# Patient Record
Sex: FEMALE | Race: ASIAN | NOT HISPANIC OR LATINO
[De-identification: names, ages, dates, MRNs, and addresses within clinical notes are randomized per-mention and may not be internally consistent; named-entity substitution may affect disease eponyms.]

---

## 2017-11-03 ENCOUNTER — APPOINTMENT (OUTPATIENT)
Dept: OBGYN | Facility: CLINIC | Age: 37
End: 2017-11-03

## 2018-03-24 ENCOUNTER — OUTPATIENT (OUTPATIENT)
Dept: OUTPATIENT SERVICES | Facility: HOSPITAL | Age: 38
LOS: 1 days | End: 2018-03-24
Payer: COMMERCIAL

## 2018-03-24 DIAGNOSIS — O26.899 OTHER SPECIFIED PREGNANCY RELATED CONDITIONS, UNSPECIFIED TRIMESTER: ICD-10-CM

## 2018-03-24 DIAGNOSIS — Z3A.00 WEEKS OF GESTATION OF PREGNANCY NOT SPECIFIED: ICD-10-CM

## 2018-03-24 PROCEDURE — G0463: CPT

## 2018-03-24 PROCEDURE — 59025 FETAL NON-STRESS TEST: CPT

## 2018-04-18 ENCOUNTER — TRANSCRIPTION ENCOUNTER (OUTPATIENT)
Age: 38
End: 2018-04-18

## 2018-06-17 ENCOUNTER — OUTPATIENT (OUTPATIENT)
Dept: OUTPATIENT SERVICES | Facility: HOSPITAL | Age: 38
LOS: 1 days | End: 2018-06-17
Payer: COMMERCIAL

## 2018-06-17 DIAGNOSIS — O26.899 OTHER SPECIFIED PREGNANCY RELATED CONDITIONS, UNSPECIFIED TRIMESTER: ICD-10-CM

## 2018-06-17 DIAGNOSIS — Z3A.00 WEEKS OF GESTATION OF PREGNANCY NOT SPECIFIED: ICD-10-CM

## 2018-06-17 PROCEDURE — G0463: CPT

## 2018-06-17 PROCEDURE — 59025 FETAL NON-STRESS TEST: CPT

## 2018-06-17 PROCEDURE — 59025 FETAL NON-STRESS TEST: CPT | Mod: 26

## 2018-06-19 ENCOUNTER — TRANSCRIPTION ENCOUNTER (OUTPATIENT)
Age: 38
End: 2018-06-19

## 2018-06-20 ENCOUNTER — INPATIENT (INPATIENT)
Facility: HOSPITAL | Age: 38
LOS: 1 days | Discharge: HOME | End: 2018-06-22
Attending: OBSTETRICS & GYNECOLOGY | Admitting: OBSTETRICS & GYNECOLOGY
Payer: COMMERCIAL

## 2018-06-20 VITALS — WEIGHT: 145.51 LBS | HEIGHT: 62 IN

## 2018-06-20 DIAGNOSIS — Z3A.00 WEEKS OF GESTATION OF PREGNANCY NOT SPECIFIED: ICD-10-CM

## 2018-06-20 DIAGNOSIS — Z34.80 ENCOUNTER FOR SUPERVISION OF OTHER NORMAL PREGNANCY, UNSPECIFIED TRIMESTER: ICD-10-CM

## 2018-06-20 DIAGNOSIS — O26.899 OTHER SPECIFIED PREGNANCY RELATED CONDITIONS, UNSPECIFIED TRIMESTER: ICD-10-CM

## 2018-06-20 LAB
BASOPHILS # BLD AUTO: 0.1 K/UL — SIGNIFICANT CHANGE UP (ref 0–0.2)
BASOPHILS NFR BLD AUTO: 0.5 % — SIGNIFICANT CHANGE UP (ref 0–2)
BLD GP AB SCN SERPL QL: NEGATIVE — SIGNIFICANT CHANGE UP
EOSINOPHIL # BLD AUTO: 0.1 K/UL — SIGNIFICANT CHANGE UP (ref 0–0.5)
EOSINOPHIL NFR BLD AUTO: 0.9 % — SIGNIFICANT CHANGE UP (ref 0–6)
HCT VFR BLD CALC: 38.9 % — SIGNIFICANT CHANGE UP (ref 34.5–45)
HGB BLD-MCNC: 12.8 G/DL — SIGNIFICANT CHANGE UP (ref 11.5–15.5)
LYMPHOCYTES # BLD AUTO: 1.8 K/UL — SIGNIFICANT CHANGE UP (ref 1–3.3)
LYMPHOCYTES # BLD AUTO: 15.6 % — SIGNIFICANT CHANGE UP (ref 13–44)
MCHC RBC-ENTMCNC: 30.5 PG — SIGNIFICANT CHANGE UP (ref 27–34)
MCHC RBC-ENTMCNC: 32.9 GM/DL — SIGNIFICANT CHANGE UP (ref 32–36)
MCV RBC AUTO: 92.7 FL — SIGNIFICANT CHANGE UP (ref 80–100)
MONOCYTES # BLD AUTO: 0.9 K/UL — SIGNIFICANT CHANGE UP (ref 0–0.9)
MONOCYTES NFR BLD AUTO: 7.8 % — SIGNIFICANT CHANGE UP (ref 2–14)
NEUTROPHILS # BLD AUTO: 8.6 K/UL — HIGH (ref 1.8–7.4)
NEUTROPHILS NFR BLD AUTO: 75.1 % — SIGNIFICANT CHANGE UP (ref 43–77)
PLATELET # BLD AUTO: 221 K/UL — SIGNIFICANT CHANGE UP (ref 150–400)
RBC # BLD: 4.19 M/UL — SIGNIFICANT CHANGE UP (ref 3.8–5.2)
RBC # FLD: 13.6 % — SIGNIFICANT CHANGE UP (ref 10.3–14.5)
RH IG SCN BLD-IMP: POSITIVE — SIGNIFICANT CHANGE UP
RH IG SCN BLD-IMP: POSITIVE — SIGNIFICANT CHANGE UP
T PALLIDUM AB TITR SER: NEGATIVE — SIGNIFICANT CHANGE UP
WBC # BLD: 11.5 K/UL — HIGH (ref 3.8–10.5)
WBC # FLD AUTO: 11.5 K/UL — HIGH (ref 3.8–10.5)

## 2018-06-20 RX ORDER — OXYTOCIN 10 UNIT/ML
333.33 VIAL (ML) INJECTION
Qty: 20 | Refills: 0 | Status: COMPLETED | OUTPATIENT
Start: 2018-06-20

## 2018-06-20 RX ORDER — IBUPROFEN 200 MG
600 TABLET ORAL EVERY 6 HOURS
Qty: 0 | Refills: 0 | Status: DISCONTINUED | OUTPATIENT
Start: 2018-06-20 | End: 2018-06-22

## 2018-06-20 RX ORDER — DOCUSATE SODIUM 100 MG
100 CAPSULE ORAL
Qty: 0 | Refills: 0 | Status: DISCONTINUED | OUTPATIENT
Start: 2018-06-20 | End: 2018-06-20

## 2018-06-20 RX ORDER — OXYTOCIN 10 UNIT/ML
41.67 VIAL (ML) INJECTION
Qty: 20 | Refills: 0 | Status: DISCONTINUED | OUTPATIENT
Start: 2018-06-20 | End: 2018-06-20

## 2018-06-20 RX ORDER — KETOROLAC TROMETHAMINE 30 MG/ML
30 SYRINGE (ML) INJECTION ONCE
Qty: 0 | Refills: 0 | Status: DISCONTINUED | OUTPATIENT
Start: 2018-06-20 | End: 2018-06-20

## 2018-06-20 RX ORDER — HYDROCORTISONE 1 %
1 OINTMENT (GRAM) TOPICAL EVERY 4 HOURS
Qty: 0 | Refills: 0 | Status: DISCONTINUED | OUTPATIENT
Start: 2018-06-20 | End: 2018-06-20

## 2018-06-20 RX ORDER — SODIUM CHLORIDE 9 MG/ML
1000 INJECTION, SOLUTION INTRAVENOUS ONCE
Qty: 0 | Refills: 0 | Status: COMPLETED | OUTPATIENT
Start: 2018-06-20 | End: 2018-06-20

## 2018-06-20 RX ORDER — OXYCODONE HYDROCHLORIDE 5 MG/1
5 TABLET ORAL
Qty: 0 | Refills: 0 | Status: DISCONTINUED | OUTPATIENT
Start: 2018-06-20 | End: 2018-06-22

## 2018-06-20 RX ORDER — SODIUM CHLORIDE 9 MG/ML
1000 INJECTION, SOLUTION INTRAVENOUS
Qty: 0 | Refills: 0 | Status: DISCONTINUED | OUTPATIENT
Start: 2018-06-20 | End: 2018-06-20

## 2018-06-20 RX ORDER — SODIUM CHLORIDE 9 MG/ML
3 INJECTION INTRAMUSCULAR; INTRAVENOUS; SUBCUTANEOUS EVERY 8 HOURS
Qty: 0 | Refills: 0 | Status: DISCONTINUED | OUTPATIENT
Start: 2018-06-20 | End: 2018-06-22

## 2018-06-20 RX ORDER — ACETAMINOPHEN 500 MG
975 TABLET ORAL EVERY 6 HOURS
Qty: 0 | Refills: 0 | Status: DISCONTINUED | OUTPATIENT
Start: 2018-06-20 | End: 2018-06-22

## 2018-06-20 RX ORDER — DIBUCAINE 1 %
1 OINTMENT (GRAM) RECTAL EVERY 4 HOURS
Qty: 0 | Refills: 0 | Status: DISCONTINUED | OUTPATIENT
Start: 2018-06-20 | End: 2018-06-20

## 2018-06-20 RX ORDER — OXYTOCIN 10 UNIT/ML
333.33 VIAL (ML) INJECTION
Qty: 20 | Refills: 0 | Status: DISCONTINUED | OUTPATIENT
Start: 2018-06-20 | End: 2018-06-22

## 2018-06-20 RX ORDER — SODIUM CHLORIDE 9 MG/ML
3 INJECTION INTRAMUSCULAR; INTRAVENOUS; SUBCUTANEOUS EVERY 8 HOURS
Qty: 0 | Refills: 0 | Status: DISCONTINUED | OUTPATIENT
Start: 2018-06-20 | End: 2018-06-20

## 2018-06-20 RX ORDER — DOCUSATE SODIUM 100 MG
100 CAPSULE ORAL THREE TIMES A DAY
Qty: 0 | Refills: 0 | Status: DISCONTINUED | OUTPATIENT
Start: 2018-06-20 | End: 2018-06-20

## 2018-06-20 RX ORDER — CITRIC ACID/SODIUM CITRATE 300-500 MG
15 SOLUTION, ORAL ORAL EVERY 4 HOURS
Qty: 0 | Refills: 0 | Status: DISCONTINUED | OUTPATIENT
Start: 2018-06-20 | End: 2018-06-20

## 2018-06-20 RX ORDER — OXYCODONE HYDROCHLORIDE 5 MG/1
5 TABLET ORAL EVERY 4 HOURS
Qty: 0 | Refills: 0 | Status: DISCONTINUED | OUTPATIENT
Start: 2018-06-20 | End: 2018-06-22

## 2018-06-20 RX ORDER — PRAMOXINE HYDROCHLORIDE 150 MG/15G
1 AEROSOL, FOAM RECTAL EVERY 4 HOURS
Qty: 0 | Refills: 0 | Status: DISCONTINUED | OUTPATIENT
Start: 2018-06-20 | End: 2018-06-20

## 2018-06-20 RX ORDER — OXYTOCIN 10 UNIT/ML
4 VIAL (ML) INJECTION
Qty: 30 | Refills: 0 | Status: DISCONTINUED | OUTPATIENT
Start: 2018-06-20 | End: 2018-06-20

## 2018-06-20 RX ORDER — DOCUSATE SODIUM 100 MG
100 CAPSULE ORAL THREE TIMES A DAY
Qty: 0 | Refills: 0 | Status: DISCONTINUED | OUTPATIENT
Start: 2018-06-20 | End: 2018-06-21

## 2018-06-20 RX ORDER — AER TRAVELER 0.5 G/1
1 SOLUTION RECTAL; TOPICAL EVERY 4 HOURS
Qty: 0 | Refills: 0 | Status: DISCONTINUED | OUTPATIENT
Start: 2018-06-20 | End: 2018-06-20

## 2018-06-20 RX ORDER — DIPHENHYDRAMINE HCL 50 MG
25 CAPSULE ORAL EVERY 6 HOURS
Qty: 0 | Refills: 0 | Status: DISCONTINUED | OUTPATIENT
Start: 2018-06-20 | End: 2018-06-22

## 2018-06-20 RX ORDER — OXYTOCIN 10 UNIT/ML
333.33 VIAL (ML) INJECTION
Qty: 20 | Refills: 0 | Status: DISCONTINUED | OUTPATIENT
Start: 2018-06-20 | End: 2018-06-20

## 2018-06-20 RX ORDER — OXYTOCIN 10 UNIT/ML
41.67 VIAL (ML) INJECTION
Qty: 20 | Refills: 0 | Status: DISCONTINUED | OUTPATIENT
Start: 2018-06-20 | End: 2018-06-22

## 2018-06-20 RX ORDER — CEFAZOLIN SODIUM 1 G
2000 VIAL (EA) INJECTION ONCE
Qty: 0 | Refills: 0 | Status: COMPLETED | OUTPATIENT
Start: 2018-06-20 | End: 2018-06-20

## 2018-06-20 RX ORDER — ACETAMINOPHEN 500 MG
975 TABLET ORAL EVERY 6 HOURS
Qty: 0 | Refills: 0 | Status: COMPLETED | OUTPATIENT
Start: 2018-06-20 | End: 2019-05-19

## 2018-06-20 RX ORDER — IBUPROFEN 200 MG
600 TABLET ORAL EVERY 6 HOURS
Qty: 0 | Refills: 0 | Status: COMPLETED | OUTPATIENT
Start: 2018-06-20 | End: 2019-05-19

## 2018-06-20 RX ADMIN — Medication 30 MILLIGRAM(S): at 13:15

## 2018-06-20 RX ADMIN — Medication 975 MILLIGRAM(S): at 23:14

## 2018-06-20 RX ADMIN — Medication 4 MILLIUNIT(S)/MIN: at 09:09

## 2018-06-20 RX ADMIN — Medication 125 MILLIUNIT(S)/MIN: at 11:50

## 2018-06-20 RX ADMIN — Medication 600 MILLIGRAM(S): at 23:13

## 2018-06-20 RX ADMIN — Medication 100 MILLIGRAM(S): at 17:08

## 2018-06-20 RX ADMIN — SODIUM CHLORIDE 2000 MILLILITER(S): 9 INJECTION, SOLUTION INTRAVENOUS at 06:30

## 2018-06-20 RX ADMIN — Medication 600 MILLIGRAM(S): at 17:06

## 2018-06-20 RX ADMIN — Medication 975 MILLIGRAM(S): at 17:06

## 2018-06-20 RX ADMIN — Medication 100 MILLIGRAM(S): at 16:26

## 2018-06-20 RX ADMIN — Medication 100 MILLIGRAM(S): at 23:13

## 2018-06-20 RX ADMIN — Medication 1000 MILLIUNIT(S)/MIN: at 11:19

## 2018-06-21 ENCOUNTER — TRANSCRIPTION ENCOUNTER (OUTPATIENT)
Age: 38
End: 2018-06-21

## 2018-06-21 LAB
HCT VFR BLD CALC: 28.6 % — LOW (ref 34.5–45)
HGB BLD-MCNC: 9.3 G/DL — LOW (ref 11.5–15.5)

## 2018-06-21 RX ORDER — FERROUS SULFATE 325(65) MG
325 TABLET ORAL
Qty: 0 | Refills: 0 | Status: DISCONTINUED | OUTPATIENT
Start: 2018-06-21 | End: 2018-06-22

## 2018-06-21 RX ORDER — DOCUSATE SODIUM 100 MG
100 CAPSULE ORAL
Qty: 0 | Refills: 0 | Status: DISCONTINUED | OUTPATIENT
Start: 2018-06-21 | End: 2018-06-22

## 2018-06-21 RX ORDER — ASCORBIC ACID 60 MG
250 TABLET,CHEWABLE ORAL
Qty: 0 | Refills: 0 | Status: DISCONTINUED | OUTPATIENT
Start: 2018-06-21 | End: 2018-06-22

## 2018-06-21 RX ADMIN — Medication 975 MILLIGRAM(S): at 17:36

## 2018-06-21 RX ADMIN — Medication 600 MILLIGRAM(S): at 17:36

## 2018-06-21 RX ADMIN — Medication 975 MILLIGRAM(S): at 00:10

## 2018-06-21 RX ADMIN — Medication 250 MILLIGRAM(S): at 17:36

## 2018-06-21 RX ADMIN — Medication 975 MILLIGRAM(S): at 23:19

## 2018-06-21 RX ADMIN — Medication 100 MILLIGRAM(S): at 10:50

## 2018-06-21 RX ADMIN — Medication 600 MILLIGRAM(S): at 23:17

## 2018-06-21 RX ADMIN — Medication 975 MILLIGRAM(S): at 10:48

## 2018-06-21 RX ADMIN — Medication 975 MILLIGRAM(S): at 05:22

## 2018-06-21 RX ADMIN — Medication 975 MILLIGRAM(S): at 18:17

## 2018-06-21 RX ADMIN — Medication 325 MILLIGRAM(S): at 17:36

## 2018-06-21 RX ADMIN — Medication 600 MILLIGRAM(S): at 06:20

## 2018-06-21 RX ADMIN — Medication 100 MILLIGRAM(S): at 17:36

## 2018-06-21 RX ADMIN — Medication 600 MILLIGRAM(S): at 18:18

## 2018-06-21 RX ADMIN — Medication 600 MILLIGRAM(S): at 05:21

## 2018-06-21 RX ADMIN — Medication 600 MILLIGRAM(S): at 10:49

## 2018-06-21 RX ADMIN — Medication 975 MILLIGRAM(S): at 06:20

## 2018-06-21 RX ADMIN — Medication 600 MILLIGRAM(S): at 00:10

## 2018-06-21 NOTE — CHART NOTE - NSCHARTNOTEFT_GEN_A_CORE
PA Anemia NOTE     Day 1       Vital Signs Last 24 Hrs  T(C): 36.6 (2018 09:15), Max: 37.6 (2018 11:55)  T(F): 97.9 (2018 09:15), Max: 99.7 (2018 11:55)  HR: 78 (2018 09:15) (66 - 103)  BP: 112/78 (2018 09:15) (86/54 - 112/78)  BP(mean): 75 (2018 14:00) (68 - 76)  RR: 18 (2018 09:15) (17 - 18)  SpO2: 98% (2018 09:15) (95% - 99%)               9.3    x     )-----------( x        ( 21 @ 08:04 )             28.6                12.8   11.5  )-----------( 221      ( 20 @ 06:42 )             38.9         Assessment:  38y    y.o. S/P  with Anemia, Asx, VSS    Plan:  - Ferrous Sulfate, Colace, Vitamin C supplementation.  - Monitor for signs/symptoms of anemia.     ARACELI Ramirez

## 2018-06-21 NOTE — PROGRESS NOTE ADULT - SUBJECTIVE AND OBJECTIVE BOX
PPD#1  Patient seen and examined at bedside, no acute overnight events. No acute complaints, pain well controlled. She had a shaffer placed yesterday afternoon for inability to void after delivery.   Patient is ambulating, passing gas, and tolerating regular diet. Pt is formula feeding her baby.    Vital Signs Last 24 Hours  T(C): 36.3 (06-21-18 @ 06:01), Max: 37.6 (06-20-18 @ 11:55)  HR: 66 (06-21-18 @ 06:01) (66 - 103)  BP: 107/73 (06-21-18 @ 06:01) (86/54 - 107/73)  RR: 18 (06-21-18 @ 06:01) (17 - 18)  SpO2: 96% (06-20-18 @ 14:00) (95% - 99%)    Physical Exam:  General: NAD  Abdomen: Soft, non-tender, non-distended, fundus firm  Pelvic: Lochia wnl  Ext: NTBL  Labs:  Blood type: O Positive  Rubella IgG: RPR: Negative                          12.8   11.5<H> >-----------< 221    ( 06-20 @ 06:42 )             38.9                  MEDICATIONS  (STANDING):  acetaminophen   Tablet. 975 milliGRAM(s) Oral every 6 hours  docusate sodium 100 milliGRAM(s) Oral three times a day  ibuprofen  Tablet 600 milliGRAM(s) Oral every 6 hours  oxyCODONE    IR 5 milliGRAM(s) Oral every 3 hours  oxytocin Infusion 333.333 milliUNIT(s)/Min (1000 mL/Hr) IV Continuous <Continuous>  oxytocin Infusion 41.667 milliUNIT(s)/Min (125 mL/Hr) IV Continuous <Continuous>  prenatal multivitamin 1 Tablet(s) Oral daily  sodium chloride 0.9% lock flush 3 milliLiter(s) IV Push every 8 hours    MEDICATIONS  (PRN):  diphenhydrAMINE   Capsule 25 milliGRAM(s) Oral every 6 hours PRN Itching  oxyCODONE    IR 5 milliGRAM(s) Oral every 4 hours PRN Severe Pain (7 -10)

## 2018-06-21 NOTE — PROGRESS NOTE ADULT - PROBLEM SELECTOR PLAN 1
-routine postpartum care  -colace TID, sitz baths for 4th degree  -alexandro shaffer this AM and patient will be DTV  Serene Marrero PGY2

## 2018-06-22 VITALS
HEART RATE: 85 BPM | SYSTOLIC BLOOD PRESSURE: 129 MMHG | OXYGEN SATURATION: 97 % | RESPIRATION RATE: 18 BRPM | DIASTOLIC BLOOD PRESSURE: 68 MMHG | TEMPERATURE: 98 F

## 2018-06-22 PROCEDURE — 86900 BLOOD TYPING SEROLOGIC ABO: CPT

## 2018-06-22 PROCEDURE — 85018 HEMOGLOBIN: CPT

## 2018-06-22 PROCEDURE — 85027 COMPLETE CBC AUTOMATED: CPT

## 2018-06-22 PROCEDURE — 59050 FETAL MONITOR W/REPORT: CPT

## 2018-06-22 PROCEDURE — 86901 BLOOD TYPING SEROLOGIC RH(D): CPT

## 2018-06-22 PROCEDURE — 85014 HEMATOCRIT: CPT

## 2018-06-22 PROCEDURE — 86850 RBC ANTIBODY SCREEN: CPT

## 2018-06-22 PROCEDURE — 86780 TREPONEMA PALLIDUM: CPT

## 2018-06-22 PROCEDURE — 59025 FETAL NON-STRESS TEST: CPT

## 2018-06-22 PROCEDURE — G0463: CPT

## 2018-06-22 RX ADMIN — Medication 100 MILLIGRAM(S): at 05:36

## 2018-06-22 RX ADMIN — Medication 975 MILLIGRAM(S): at 00:20

## 2018-06-22 RX ADMIN — Medication 975 MILLIGRAM(S): at 12:16

## 2018-06-22 RX ADMIN — Medication 600 MILLIGRAM(S): at 06:15

## 2018-06-22 RX ADMIN — Medication 1 TABLET(S): at 12:16

## 2018-06-22 RX ADMIN — Medication 325 MILLIGRAM(S): at 05:36

## 2018-06-22 RX ADMIN — Medication 600 MILLIGRAM(S): at 05:36

## 2018-06-22 RX ADMIN — Medication 975 MILLIGRAM(S): at 06:15

## 2018-06-22 RX ADMIN — Medication 975 MILLIGRAM(S): at 13:00

## 2018-06-22 RX ADMIN — Medication 600 MILLIGRAM(S): at 13:00

## 2018-06-22 RX ADMIN — Medication 250 MILLIGRAM(S): at 05:36

## 2018-06-22 RX ADMIN — Medication 600 MILLIGRAM(S): at 12:16

## 2018-06-22 RX ADMIN — Medication 975 MILLIGRAM(S): at 05:36

## 2018-06-22 RX ADMIN — Medication 600 MILLIGRAM(S): at 00:20

## 2018-06-22 NOTE — PROGRESS NOTE ADULT - PROBLEM SELECTOR PLAN 1
- Pain well controlled, continue current pain regimen  -continue colace TID  - Increase ambulation, SCDs when not ambulating  - Continue regular diet  - Discharge planning     Marlin Ca PGY-3

## 2018-06-22 NOTE — PROGRESS NOTE ADULT - SUBJECTIVE AND OBJECTIVE BOX
S: Patient doing well. No complaints. Minimal lochia. Pain controlled.    O: Vital Signs Last 24 Hrs  T(C): 36.4 (22 Jun 2018 05:00), Max: 36.8 (21 Jun 2018 21:46)  T(F): 97.5 (22 Jun 2018 05:00), Max: 98.2 (21 Jun 2018 21:46)  HR: 78 (22 Jun 2018 05:00) (78 - 87)  BP: 99/64 (22 Jun 2018 05:00) (97/70 - 112/78)  BP(mean): --  RR: 18 (22 Jun 2018 05:00) (18 - 18)  SpO2: 97% (21 Jun 2018 21:46) (97% - 98%)    Gen: NAD  Abd: soft, Nontender, Nondistended, fundus firm  Ext: no tendern, mild edema    Labs:                        9.3    x     )-----------( x        ( 21 Jun 2018 08:04 )             28.6       A: 38y PPD#2 s/p LFD doing well.    Plan:  Routine postpartum care  Encouraged out of bed  Regular diet    Discharge  HERMAN Jenkins MD

## 2018-06-22 NOTE — DISCHARGE NOTE OB - CARE PROVIDER_API CALL
Salvatore Jenkins), Obstetrics and Gynecology  [address] [place] [id number]  Phone: [phone]  Fax: [phone]

## 2018-06-22 NOTE — PROGRESS NOTE ADULT - SUBJECTIVE AND OBJECTIVE BOX
OB Progress Note:  PPD#2    S: 39yo PPD#2 s/p . Patient feels well. Pain is well controlled. She is tolerating a regular diet and passing flatus. She is voiding spontaneously, and ambulating without difficulty. Denies CP/SOB. Denies lightheadedness/dizziness. Denies N/V.    O:  Vitals:   Vital Signs Last 24 Hrs  T(C): 36.4 (2018 05:00), Max: 36.8 (2018 21:46)  T(F): 97.5 (2018 05:00), Max: 98.2 (2018 21:46)  HR: 78 (2018 05:00) (78 - 87)  BP: 99/64 (2018 05:00) (97/70 - 112/78)  BP(mean): --  RR: 18 (2018 05:00) (18 - 18)  SpO2: 97% (2018 21:46) (97% - 98%)    MEDICATIONS  (STANDING):  acetaminophen   Tablet. 975 milliGRAM(s) Oral every 6 hours  ascorbic acid 250 milliGRAM(s) Oral two times a day  docusate sodium 100 milliGRAM(s) Oral two times a day  ferrous    sulfate 325 milliGRAM(s) Oral two times a day  ibuprofen  Tablet 600 milliGRAM(s) Oral every 6 hours  oxyCODONE    IR 5 milliGRAM(s) Oral every 3 hours  oxytocin Infusion 333.333 milliUNIT(s)/Min (1000 mL/Hr) IV Continuous <Continuous>  oxytocin Infusion 41.667 milliUNIT(s)/Min (125 mL/Hr) IV Continuous <Continuous>  prenatal multivitamin 1 Tablet(s) Oral daily  sodium chloride 0.9% lock flush 3 milliLiter(s) IV Push every 8 hours    MEDICATIONS  (PRN):  diphenhydrAMINE   Capsule 25 milliGRAM(s) Oral every 6 hours PRN Itching  oxyCODONE    IR 5 milliGRAM(s) Oral every 4 hours PRN Severe Pain (7 -10)      Labs:  Blood type: O Positive  Rubella IgG: RPR: Negative                          9.3<L>   -- >-----------< --    (  @ 08:04 )             28.6<L>                        12.8   11.5<H> >-----------< 221    (  @ 06:42 )             38.9                  Physical Exam:  General: NAD  Abdomen: soft, non-tender, non-distended, fundus firm  Vaginal: Lochia wnl  Extremities: No erythema/edema

## 2022-03-25 ENCOUNTER — OUTPATIENT (OUTPATIENT)
Dept: OUTPATIENT SERVICES | Facility: HOSPITAL | Age: 42
LOS: 1 days | End: 2022-03-25
Payer: COMMERCIAL

## 2022-03-25 VITALS
HEIGHT: 61.5 IN | RESPIRATION RATE: 18 BRPM | HEART RATE: 79 BPM | WEIGHT: 119.05 LBS | DIASTOLIC BLOOD PRESSURE: 72 MMHG | SYSTOLIC BLOOD PRESSURE: 111 MMHG | TEMPERATURE: 98 F | OXYGEN SATURATION: 96 %

## 2022-03-25 DIAGNOSIS — O02.1 MISSED ABORTION: ICD-10-CM

## 2022-03-25 DIAGNOSIS — Z98.890 OTHER SPECIFIED POSTPROCEDURAL STATES: Chronic | ICD-10-CM

## 2022-03-25 DIAGNOSIS — Z01.818 ENCOUNTER FOR OTHER PREPROCEDURAL EXAMINATION: ICD-10-CM

## 2022-03-25 DIAGNOSIS — Z11.52 ENCOUNTER FOR SCREENING FOR COVID-19: ICD-10-CM

## 2022-03-25 DIAGNOSIS — N84.0 POLYP OF CORPUS UTERI: Chronic | ICD-10-CM

## 2022-03-25 LAB — SARS-COV-2 RNA SPEC QL NAA+PROBE: SIGNIFICANT CHANGE UP

## 2022-03-25 PROCEDURE — 86850 RBC ANTIBODY SCREEN: CPT

## 2022-03-25 PROCEDURE — U0005: CPT

## 2022-03-25 PROCEDURE — 86901 BLOOD TYPING SEROLOGIC RH(D): CPT

## 2022-03-25 PROCEDURE — U0003: CPT

## 2022-03-25 PROCEDURE — 36415 COLL VENOUS BLD VENIPUNCTURE: CPT

## 2022-03-25 PROCEDURE — C9803: CPT

## 2022-03-25 PROCEDURE — 86900 BLOOD TYPING SEROLOGIC ABO: CPT

## 2022-03-25 PROCEDURE — 85027 COMPLETE CBC AUTOMATED: CPT

## 2022-03-25 PROCEDURE — G0463: CPT

## 2022-03-25 RX ORDER — LIDOCAINE HCL 20 MG/ML
0.2 VIAL (ML) INJECTION ONCE
Refills: 0 | Status: DISCONTINUED | OUTPATIENT
Start: 2022-03-29 | End: 2022-04-12

## 2022-03-25 RX ORDER — SODIUM CHLORIDE 9 MG/ML
1000 INJECTION, SOLUTION INTRAVENOUS
Refills: 0 | Status: DISCONTINUED | OUTPATIENT
Start: 2022-03-29 | End: 2022-04-12

## 2022-03-25 NOTE — H&P PST ADULT - FALL HARM RISK - UNIVERSAL INTERVENTIONS
Bed in lowest position, wheels locked, appropriate side rails in place/Call bell, personal items and telephone in reach/Instruct patient to call for assistance before getting out of bed or chair/Non-slip footwear when patient is out of bed/[place] to call system/Physically safe environment - no spills, clutter or unnecessary equipment/Purposeful Proactive Rounding/Room/bathroom lighting operational, light cord in reach

## 2022-03-25 NOTE — H&P PST ADULT - ATTENDING COMMENTS
Agree with above  Patient with missed  -- measuring 5-6wks  RH positive  Plan for DVC  All r/b/a discussed    ellen guajardo

## 2022-03-25 NOTE — H&P PST ADULT - HISTORY OF PRESENT ILLNESS
41yr old female with missed ab. Pt states when she went for 8wks sono check there was a sac but no fetus. Now coming in for dilation curettage for missed .    Note; covid test 3/25/2022 [facility]

## 2022-03-26 ENCOUNTER — TRANSCRIPTION ENCOUNTER (OUTPATIENT)
Age: 42
End: 2022-03-26

## 2022-03-28 ENCOUNTER — TRANSCRIPTION ENCOUNTER (OUTPATIENT)
Age: 42
End: 2022-03-28

## 2022-03-29 ENCOUNTER — TRANSCRIPTION ENCOUNTER (OUTPATIENT)
Age: 42
End: 2022-03-29

## 2022-03-29 ENCOUNTER — RESULT REVIEW (OUTPATIENT)
Age: 42
End: 2022-03-29

## 2022-03-29 ENCOUNTER — OUTPATIENT (OUTPATIENT)
Dept: OUTPATIENT SERVICES | Facility: HOSPITAL | Age: 42
LOS: 1 days | End: 2022-03-29
Payer: COMMERCIAL

## 2022-03-29 VITALS
DIASTOLIC BLOOD PRESSURE: 68 MMHG | HEIGHT: 61.5 IN | SYSTOLIC BLOOD PRESSURE: 102 MMHG | RESPIRATION RATE: 16 BRPM | WEIGHT: 119.05 LBS | TEMPERATURE: 97 F | OXYGEN SATURATION: 99 % | HEART RATE: 76 BPM

## 2022-03-29 VITALS
SYSTOLIC BLOOD PRESSURE: 96 MMHG | DIASTOLIC BLOOD PRESSURE: 54 MMHG | HEART RATE: 64 BPM | OXYGEN SATURATION: 96 % | TEMPERATURE: 98 F | RESPIRATION RATE: 18 BRPM

## 2022-03-29 DIAGNOSIS — Z98.890 OTHER SPECIFIED POSTPROCEDURAL STATES: Chronic | ICD-10-CM

## 2022-03-29 DIAGNOSIS — O02.1 MISSED ABORTION: ICD-10-CM

## 2022-03-29 DIAGNOSIS — N84.0 POLYP OF CORPUS UTERI: Chronic | ICD-10-CM

## 2022-03-29 LAB
BLD GP AB SCN SERPL QL: NEGATIVE — SIGNIFICANT CHANGE UP
RH IG SCN BLD-IMP: POSITIVE — SIGNIFICANT CHANGE UP

## 2022-03-29 PROCEDURE — 86901 BLOOD TYPING SEROLOGIC RH(D): CPT

## 2022-03-29 PROCEDURE — 88280 CHROMOSOME KARYOTYPE STUDY: CPT

## 2022-03-29 PROCEDURE — 88305 TISSUE EXAM BY PATHOLOGIST: CPT | Mod: 26

## 2022-03-29 PROCEDURE — 59820 CARE OF MISCARRIAGE: CPT

## 2022-03-29 PROCEDURE — 86850 RBC ANTIBODY SCREEN: CPT

## 2022-03-29 PROCEDURE — 88264 CHROMOSOME ANALYSIS 20-25: CPT

## 2022-03-29 PROCEDURE — 86900 BLOOD TYPING SEROLOGIC ABO: CPT

## 2022-03-29 PROCEDURE — 88305 TISSUE EXAM BY PATHOLOGIST: CPT

## 2022-03-29 PROCEDURE — 88233 TISSUE CULTURE SKIN/BIOPSY: CPT

## 2022-03-29 RX ORDER — FENTANYL CITRATE 50 UG/ML
25 INJECTION INTRAVENOUS
Refills: 0 | Status: DISCONTINUED | OUTPATIENT
Start: 2022-03-29 | End: 2022-03-29

## 2022-03-29 RX ORDER — ONDANSETRON 8 MG/1
4 TABLET, FILM COATED ORAL ONCE
Refills: 0 | Status: DISCONTINUED | OUTPATIENT
Start: 2022-03-29 | End: 2022-04-12

## 2022-03-29 RX ORDER — FENTANYL CITRATE 50 UG/ML
50 INJECTION INTRAVENOUS
Refills: 0 | Status: DISCONTINUED | OUTPATIENT
Start: 2022-03-29 | End: 2022-03-29

## 2022-03-29 NOTE — ASU PATIENT PROFILE, ADULT - FALL HARM RISK - UNIVERSAL INTERVENTIONS
Bed in lowest position, wheels locked, appropriate side rails in place/Call bell, personal items and telephone in reach/Instruct patient to call for assistance before getting out of bed or chair/Non-slip footwear when patient is out of bed/[place] to call system/Physically safe environment - no spills, clutter or unnecessary equipment/Purposeful Proactive Rounding/Room/bathroom lighting operational, light cord in reach

## 2022-03-29 NOTE — ASU PATIENT PROFILE, ADULT - FALL HARM RISK - FALLEN IN PAST
Patient noticed the Neulasta Onpro needle sticking out of the box. When reading directions she was not sure if she needed to call or not. The OnPro already injected into patient. She had some blood at injection site and applied pressure that stopped the bleeding. Gauge on OnPro states empty.    Patient is asking if she should be concerned about the needle sticking out after medication already injected into her skin. Informed her it is concerning if the needle was not in her arm when medication injected, but she feels she got the medication.  She states she will place the needle in a cottage cheese container to throw in the garbage. Confirmed that is correct, just so needle isn't exposed when placing in garbage.            No

## 2022-03-29 NOTE — ASU PREOP CHECKLIST - ALLERGIES REVIEWED
Problem: Breastfeeding  Goal: Mom maintains milk supply when infant ill/premature  Intervention: Educate mom on pumping 8x per 24 hours for 10-15 minutes each time with hospital grade pump, one 5 hr stretch at night  Verified mother's understanding of proper pump use and settings, educated on cleaning of pump parts and soap provided.    Plan is to pump Q 2-3 hours during the day while awake and to leave time for a 4-5 hour stretch of sleep at night, mother aware she is to pump at least 8 times every 24 hours.    Mother has HHP, paperwork for breast pump provided, no HG pumps available, placed on waiting list for HG pump.    Encouraged to call for assistance as needed.               done

## 2022-03-29 NOTE — ASU DISCHARGE PLAN (ADULT/PEDIATRIC) - NS MD DC FALL RISK RISK
For information on Fall & Injury Prevention, visit: [email or web address] OR  [email or web address] OR  https://www.cdc.gov/steadi/patient.html

## 2022-03-29 NOTE — ASU DISCHARGE PLAN (ADULT/PEDIATRIC) - CARE PROVIDER_API CALL
Fiorella Orona)  [facility] Obstetrics and Gynecology  [address] [place] [address], [address]  [place] [address]  Phone: [phone]  Fax: [phone]  Follow Up Time:

## 2022-03-29 NOTE — ASU DISCHARGE PLAN (ADULT/PEDIATRIC) - NURSING INSTRUCTIONS
LAST DOSE OF TYLENOL WAS GIVEN AT 1035AM --> NEXT DOSE OF TYLENOL IS OK TO TAKE AT 4:35PM , IF NEEDED FOR PAIN. Do not exceed more than 4000mg of Tylenol in one 24 hour setting. LAST DOSE OF IBUPROFEN WAS GIVEN AT 1040AM --> NEXT DOSE OF IBUPROFEN OK TO TAKE AT 4:41PM, AS NEEDED FOR PAIN.

## 2022-03-29 NOTE — ASU PREOP CHECKLIST - HEART RATE (BEATS/MIN)
CC Right elbow pain.  Prior patient.    RHD.  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        INTERVAL HISTORY: 11/23/20  Patient notes that she has no pain in the elbow and she is doing well  She only notes a decrease in strength  Patient has been attending occupational therapy at the [employer] [facility] location, working with Pat LR. Physical Therapy with Fabricio RAJESHCyndy     PREVIOUS HISTORY:  SANE score 25% post fall. Currently 95% after therapy.     Here for routine 11 week followup. Has been doing therapy and reporting improved pain and range of motion. Denies of new paresthesias or new trauma. Notes some intermittent anterior shoulder pain and dorsal ulnar wrist pain with PT exercises, no weakness.  Minimal sxs at rest / night time.     Was walking to car, at work, stepped off a curb and fell onto the ground.  Landed onto the Right side.  07/08/20.  No numbness or tingling.  First elbow injury.  Mild pain in the Right shoulder.  No elbow pain or injuries prior to this fall.      INTERVAL HISTORY: 11/23/20  Patient notes that she has no pain in the elbow and she is doing well  She only notes a decrease in strength  Patient has been attending occupational therapy at the [employer] location, working with Pat LUO          Review of Systems   Constitution: Negative. Negative for chills, fever and night sweats.   HENT: Negative for congestion and headaches.    Eyes: Negative for blurred vision, left vision loss and right vision loss.   Cardiovascular: Negative for chest pain and syncope.   Respiratory: Negative for cough and shortness of breath.    Endocrine: Negative for polydipsia, polyphagia and polyuria.   Hematologic/Lymphatic: Negative for bleeding problem. Does not bruise/bleed easily.   Skin: Negative for dry skin, itching and rash.   Musculoskeletal: Negative for falls and muscle weakness.   Gastrointestinal: Negative for abdominal pain and bowel incontinence.   Genitourinary: Negative for bladder incontinence and  nocturia.   Neurological: Negative for disturbances in coordination, loss of balance and seizures.   Psychiatric/Behavioral: Negative for depression. The patient does not have insomnia.    Allergic/Immunologic: Negative for hives and persistent infections.     PAST MEDICAL HISTORY:   Past Medical History:   Diagnosis Date    Anemia     Diabetes mellitus type II     H. pylori infection     Hyperlipidemia     Hypertension     Unspecified vitamin D deficiency 4/24/2013     PAST SURGICAL HISTORY:   Past Surgical History:   Procedure Laterality Date    ESOPHAGOGASTRODUODENOSCOPY N/A 8/8/2018    Procedure: EGD (ESOPHAGOGASTRODUODENOSCOPY);  Surgeon: Darius Gerardo MD;  Location: [address];  Service: Endoscopy;  Laterality: N/A;    GASTRIC BYPASS       FAMILY HISTORY:   Family History   Problem Relation Age of Onset    COPD Mother     Heart disease Mother     Cancer Father         liver    Heart disease Father     Cancer Sister     Diabetes Sister     Hyperlipidemia Sister     Hypothyroidism Sister     No Known Problems Brother     No Known Problems Maternal Aunt     No Known Problems Maternal Uncle     No Known Problems Paternal Aunt     No Known Problems Paternal Uncle     No Known Problems Maternal Grandmother     No Known Problems Maternal Grandfather     No Known Problems Paternal Grandmother     No Known Problems Paternal Grandfather     Amblyopia Neg Hx     Blindness Neg Hx     Cataracts Neg Hx     Glaucoma Neg Hx     Hypertension Neg Hx     Macular degeneration Neg Hx     Retinal detachment Neg Hx     Strabismus Neg Hx     Stroke Neg Hx     Thyroid disease Neg Hx     Breast cancer Neg Hx     Colon cancer Neg Hx     Ovarian cancer Neg Hx     Stomach cancer Neg Hx     Rectal cancer Neg Hx      SOCIAL HISTORY:   Social History     Socioeconomic History    Marital status: Single     Spouse name: Not on file    Number of children: Not on file    Years of education:  Not on file    Highest education level: Not on file   Occupational History     Employer: [employer]   Social Needs    Financial resource strain: Not on file    Food insecurity     Worry: Not on file     Inability: Not on file    Transportation needs     Medical: Not on file     Non-medical: Not on file   Tobacco Use    Smoking status: Never Smoker    Smokeless tobacco: Never Used   Substance and Sexual Activity    Alcohol use: No    Drug use: No    Sexual activity: Not on file   Lifestyle    Physical activity     Days per week: Not on file     Minutes per session: Not on file    Stress: Not on file   Relationships    Social connections     Talks on phone: Not on file     Gets together: Not on file     Attends Faith service: Not on file     Active member of club or organization: Not on file     Attends meetings of clubs or organizations: Not on file     Relationship status: Not on file   Other Topics Concern    Not on file   Social History Narrative    Not on file       MEDICATIONS:   Current Outpatient Medications:     atorvastatin (LIPITOR) 40 MG tablet, TAKE ONE TABLET BY MOUTH EVERY DAY, Disp: 30 tablet, Rfl: 5    benazepriL (LOTENSIN) 40 MG tablet, TAKE ONE TABLET BY MOUTH ONCE DAILY, Disp: 30 tablet, Rfl: 11    blood sugar diagnostic (BLOOD GLUCOSE TEST) Strp, 1 strip by Misc.(Non-Drug; Combo Route) route 2 (two) times daily before meals. For mikki contour, Disp: 200 strip, Rfl: 3    chlorthalidone (HYGROTEN) 25 MG Tab, TAKE ONE TABLET BY MOUTH ONCE DAILY, Disp: 30 tablet, Rfl: 11    ergocalciferol (ERGOCALCIFEROL) 50,000 unit Cap, Take 1 capsule (50,000 Units total) by mouth every 7 days., Disp: 12 capsule, Rfl: 1    lancets (ONE TOUCH DELICA LANCETS) 33 gauge Misc, 1 lancet by Misc.(Non-Drug; Combo Route) route 2 (two) times daily., Disp: 60 each, Rfl: 11    metFORMIN (GLUCOPHAGE) 500 MG tablet, TAKE 2 TABLETS BY MOUTH TWO TIMES A DAY WITH MEALS, Disp: 360 tablet, Rfl:  "3  ALLERGIES:   Review of patient's allergies indicates:   Allergen Reactions    Codeine Nausea Only    Vicodin [hydrocodone-acetaminophen] Nausea Only       VITAL SIGNS: BP (!) 185/91   Pulse 67   Ht 5' 7" (1.702 m)   Wt 124.3 kg (274 lb)   BMI 42.91 kg/m²        PHYSICAL EXAM:  General: Patient appears alert and oriented x 3.  Mood is pleasant.  Observation of ears, eyes and nose reveal no gross abnormalities. HEENT: NCAT, sclera nonicteric  Lungs: Respirations are equal and unlabored.  Well nourished, in no acute distress and ambulates with a non-antalgic gait with no assistive devices.    Skin: Skin intact bilaterally. Sensation intact bilaterally. Compartments soft. No evidence of edema, infection, or induration.     Right ELBOW / WRIST EXTREMITY EXAM:    OBSERVATION / INSPECTION    Swelling  none    Deformity  none  Discoloration  none     Scars   none    Atrophy  None      TENDERNESS / CREPITUS (T / C):           T / C        Medial epicondyle   - / -    Med. (Ulnar) collateral ligament  - / -    Flexor pronator Musculature   - / -   Biceps tendon    - / -   Head of radius    - / -    Lateral epicondyle   - / -    Extensor Musculature   + / -   Brachioradialis   - / -   Triceps tendon   - / -   Triceps muscle   - / -   Olecranon    - / -   Olecranon bursa   - / -   Cubital fossa    - / -   Anterior jointline   - / -   Radial tunnel    -/ -             ROM: ('*' = with pain)    Right Elbow  AROM (PROM)     Extension   0 deg  (5 deg)   Flexion   145 deg (145 deg)         Pronation  90 deg  (90 deg)      Supination   80 deg  (80 deg)                 Left Elbow  AROM (PROM)     Extension   0 deg  (5 deg)   Flexion   145 deg (145 deg)         Pronation  90 deg  (90 deg)      Supination   80 deg  (80 deg)            Right Wrist  AROM (PROM)   Extension   80 deg (85 deg)   Flexion   80 deg (85 deg)         Ulnar Deviation   35 deg (40 deg)  Radial Deviation 35 deg (40 deg)             Left Wrist   AROM (PROM) "     Extension   80 deg (85 deg)   Flexion   80 deg (85 deg)         Ulnar Deviation   35 deg (40 deg)  Radial Deviation 35 deg (40 deg)        STRENGTH: ('*' = with pain)    Elbow Flexion:   5/5  Elbow Extension:  5/5  Wrist Flexion:   5/5  Wrist Extension:  5/5  :    5/5  Intrinsics:   5/5  EPL (Ext. pollicis longus): 5/5  Pinch Mechanism:  5/5    ELBOW EXAMINATION:  See above noted areas of tenderness.   Test for Ligamentous Instability - UCL normal  Test for Ligamentous Instability - LUCL normal  PLRI       neg  Tinel's (Percussion) Test - Cubital  neg  Tennis Elbow Test    neg  Golfer's Elbow Test    neg  Radial Capitellar Grind Test   neg  Valgus/Extension Overload Test  neg  Resisted Long Finger Extension Pain neg  Moving Valgus    neg  Forearm pain with resisted supination neg  Yeargeson' s (elbow pain)   neg  Hook test     neg    WRIST EXAMINATION:  See above noted areas of tenderness.   Finkelstein's Test   neg  Tinel's Test - Carpal Tunnel  neg  Phalen's Test    neg  Median Nerve Compression Test neg  Ulnar-sided Compression Test neg  LT Ballottment Test   neg  Snuff box tenderness   neg  Lau's Test    neg  LT Instability    neg  Hook of Hamate Tenderness  neg     EXTREMITY NEURO-VASCULAR EXAMINATION: Sensation grossly intact to light touch all dermatomal regions. DTR 2+ Biceps, Triceps, BR and Negative Clara's sign. Grossly intact motor function at Elbow, Wrist and Hand. Distal pulses radial and ulnar 2+, brisk cap refill, symmetric.    Other Findings:  TTP over right bicipital groove.   Minimally + speeds   Mild TTP over right extensor tendon at the elbow    Xrays (9/28/20): (AP, lateral, oblique) Right elbow ordered and reviewed by me personally today:  - Compared to images taken 1 month prior shows minimal change in terms of alignment small amount of callus formation at the radial neck as expected no additional fractures noted, healed        ASSESSMENT:  right proximal radial neck fracture,  proximal biceps tendonitis.       PLAN:  Continue occupational therapy and physical therpay  F/U PRN           76

## 2022-04-05 LAB — SURGICAL PATHOLOGY STUDY: SIGNIFICANT CHANGE UP

## 2022-04-27 LAB — CHROM ANALY OVERALL INTERP SPEC-IMP: SIGNIFICANT CHANGE UP

## 2022-11-25 PROBLEM — K21.9 GASTRO-ESOPHAGEAL REFLUX DISEASE WITHOUT ESOPHAGITIS: Chronic | Status: ACTIVE | Noted: 2022-03-25

## 2022-11-25 PROBLEM — U07.1 COVID-19: Chronic | Status: ACTIVE | Noted: 2022-03-25

## 2022-12-12 ENCOUNTER — APPOINTMENT (OUTPATIENT)
Dept: PEDIATRIC MEDICAL GENETICS | Facility: CLINIC | Age: 42
End: 2022-12-12

## 2023-01-04 ENCOUNTER — APPOINTMENT (OUTPATIENT)
Dept: ANTEPARTUM | Facility: CLINIC | Age: 43
End: 2023-01-04
Payer: COMMERCIAL

## 2023-01-04 ENCOUNTER — ASOB RESULT (OUTPATIENT)
Age: 43
End: 2023-01-04

## 2023-01-04 PROCEDURE — 76811 OB US DETAILED SNGL FETUS: CPT

## 2023-04-17 ENCOUNTER — OUTPATIENT (OUTPATIENT)
Dept: OUTPATIENT SERVICES | Facility: HOSPITAL | Age: 43
LOS: 1 days | End: 2023-04-17
Payer: COMMERCIAL

## 2023-04-17 VITALS — OXYGEN SATURATION: 96 %

## 2023-04-17 VITALS — TEMPERATURE: 99 F

## 2023-04-17 DIAGNOSIS — O26.899 OTHER SPECIFIED PREGNANCY RELATED CONDITIONS, UNSPECIFIED TRIMESTER: ICD-10-CM

## 2023-04-17 DIAGNOSIS — O09.293 SUPERVISION OF PREGNANCY WITH OTHER POOR REPRODUCTIVE OR OBSTETRIC HISTORY, THIRD TRIMESTER: ICD-10-CM

## 2023-04-17 DIAGNOSIS — Z86.16 PERSONAL HISTORY OF COVID-19: ICD-10-CM

## 2023-04-17 DIAGNOSIS — N84.0 POLYP OF CORPUS UTERI: Chronic | ICD-10-CM

## 2023-04-17 DIAGNOSIS — O36.8330 MATERNAL CARE FOR ABNORMALITIES OF THE FETAL HEART RATE OR RHYTHM, THIRD TRIMESTER, NOT APPLICABLE OR UNSPECIFIED: ICD-10-CM

## 2023-04-17 DIAGNOSIS — Z3A.36 36 WEEKS GESTATION OF PREGNANCY: ICD-10-CM

## 2023-04-17 DIAGNOSIS — K21.9 GASTRO-ESOPHAGEAL REFLUX DISEASE WITHOUT ESOPHAGITIS: ICD-10-CM

## 2023-04-17 DIAGNOSIS — Z87.42 PERSONAL HISTORY OF OTHER DISEASES OF THE FEMALE GENITAL TRACT: ICD-10-CM

## 2023-04-17 DIAGNOSIS — O09.523 SUPERVISION OF ELDERLY MULTIGRAVIDA, THIRD TRIMESTER: ICD-10-CM

## 2023-04-17 DIAGNOSIS — Z98.890 OTHER SPECIFIED POSTPROCEDURAL STATES: Chronic | ICD-10-CM

## 2023-04-17 DIAGNOSIS — O99.613 DISEASES OF THE DIGESTIVE SYSTEM COMPLICATING PREGNANCY, THIRD TRIMESTER: ICD-10-CM

## 2023-04-17 PROCEDURE — 59025 FETAL NON-STRESS TEST: CPT

## 2023-04-17 PROCEDURE — G0463: CPT

## 2023-04-17 NOTE — OB PROVIDER TRIAGE NOTE - HISTORY OF PRESENT ILLNESS
HPI: Pt is a 42y  @ 36.4wga presenting for non-reassuring tracing in the office w/ decels  FM (+)  LOF (-)  CTX (-)  VB (-)    PNC uncomplicated    OBHx:  - . 7#3oz. Uncomplicated   - mAB s/p D&C in   GynHx: Uterine polyps  PMHx: Denies  PSHx: Denies  Med: PNV  All: Tetracycline (hives)  Psych: Denies hx of mental health issues  SH: Denies hx of smoking, drinking, or drug usage during the pregnancy    Vital Signs Last 24 Hrs  T(C): 36.9 (2023 13:54), Max: 36.9 (2023 13:54)  T(F): 98.4 (2023 13:54), Max: 98.4 (2023 13:54)  HR: 93 (2023 14:12) (92 - 101)  BP: 104/68 (2023 13:54) (104/68 - 104/68)  BP(mean): --  RR: 18 (2023 13:54) (18 - 18)  SpO2: 96% (2023 14:12) (94% - 96%)    Parameters below as of 2023 13:54  Patient On (Oxygen Delivery Method): room air    FHT: 140/mod->marked/(+)accels/(-)decels  [place]: Absent  Sono: Vertex

## 2023-04-17 NOTE — OB PROVIDER TRIAGE NOTE - NSOBPROVIDERNOTE_OBGYN_ALL_OB_FT
A/P: Pt is a 42y  @ 36.4wga presenting for prolonged monitoring in the setting of non-reassuring tracing in the office. BPP was 8/8 in the office. Will monitor for x1hr and tracing has been reassuring thus far    Chucho Cobos, PGY-1  Obstetrics and Gynecology    Discussed with Jack Miller CNM A/P: Pt is a 42y  @ 36.4wga presenting for prolonged monitoring in the setting of non-reassuring tracing in the office. BPP was 8/8 in the office. Will monitor for x1hr and tracing has been reassuring thus far    1442: Tracing reviewed and reassuring. Will discharge home  - Reviewed return precautions with patient and partner who verbalized understanding     Chucho Cobos, PGY-1  Obstetrics and Gynecology    Discussed with Jack Miller CNM

## 2023-04-17 NOTE — OB RN TRIAGE NOTE - NSLDARRIVAL_OBGYN_ALL_OB_START_DATE
17-Apr-2023 13:25
I will STOP taking the medications listed below when I get home from the hospital:  None

## 2023-04-17 NOTE — OB RN TRIAGE NOTE - FALL HARM RISK - UNIVERSAL INTERVENTIONS
Bed in lowest position, wheels locked, appropriate side rails in place/Call bell, personal items and telephone in reach/Instruct patient to call for assistance before getting out of bed or chair/Non-slip footwear when patient is out of bed/[place] to call system/Physically safe environment - no spills, clutter or unnecessary equipment/Purposeful Proactive Rounding/Room/bathroom lighting operational, light cord in reach

## 2023-04-27 NOTE — DISCHARGE NOTE OB - PATIENT PORTAL LINK FT
pt has no c/o heartrate was 170 1 hour ago/see chief complaint quote
You can access the [facility] Patient Portal, offered by [facility], by registering with the following website: [email or web address]

## 2023-05-03 ENCOUNTER — INPATIENT (INPATIENT)
Facility: HOSPITAL | Age: 43
LOS: 2 days | Discharge: HOME | End: 2023-05-06
Attending: OBSTETRICS & GYNECOLOGY | Admitting: OBSTETRICS & GYNECOLOGY
Payer: COMMERCIAL

## 2023-05-03 VITALS — HEART RATE: 103 BPM | DIASTOLIC BLOOD PRESSURE: 57 MMHG | SYSTOLIC BLOOD PRESSURE: 104 MMHG

## 2023-05-03 DIAGNOSIS — Z33.1 PREGNANT STATE, INCIDENTAL: ICD-10-CM

## 2023-05-03 DIAGNOSIS — Z98.890 OTHER SPECIFIED POSTPROCEDURAL STATES: Chronic | ICD-10-CM

## 2023-05-03 DIAGNOSIS — N84.0 POLYP OF CORPUS UTERI: Chronic | ICD-10-CM

## 2023-05-03 LAB
BASOPHILS # BLD AUTO: 0.02 K/UL — SIGNIFICANT CHANGE UP (ref 0–0.2)
BASOPHILS NFR BLD AUTO: 0.3 % — SIGNIFICANT CHANGE UP (ref 0–2)
BLD GP AB SCN SERPL QL: NEGATIVE — SIGNIFICANT CHANGE UP
EOSINOPHIL # BLD AUTO: 0.14 K/UL — SIGNIFICANT CHANGE UP (ref 0–0.5)
EOSINOPHIL NFR BLD AUTO: 2.1 % — SIGNIFICANT CHANGE UP (ref 0–6)
HCT VFR BLD CALC: 32.3 % — LOW (ref 34.5–45)
HGB BLD-MCNC: 10.6 G/DL — LOW (ref 11.5–15.5)
IMM GRANULOCYTES NFR BLD AUTO: 0.6 % — SIGNIFICANT CHANGE UP (ref 0–0.9)
LYMPHOCYTES # BLD AUTO: 1.88 K/UL — SIGNIFICANT CHANGE UP (ref 1–3.3)
LYMPHOCYTES # BLD AUTO: 28.4 % — SIGNIFICANT CHANGE UP (ref 13–44)
MCHC RBC-ENTMCNC: 30.7 PG — SIGNIFICANT CHANGE UP (ref 27–34)
MCHC RBC-ENTMCNC: 32.8 GM/DL — SIGNIFICANT CHANGE UP (ref 32–36)
MCV RBC AUTO: 93.6 FL — SIGNIFICANT CHANGE UP (ref 80–100)
MONOCYTES # BLD AUTO: 0.78 K/UL — SIGNIFICANT CHANGE UP (ref 0–0.9)
MONOCYTES NFR BLD AUTO: 11.8 % — SIGNIFICANT CHANGE UP (ref 2–14)
NEUTROPHILS # BLD AUTO: 3.76 K/UL — SIGNIFICANT CHANGE UP (ref 1.8–7.4)
NEUTROPHILS NFR BLD AUTO: 56.8 % — SIGNIFICANT CHANGE UP (ref 43–77)
NRBC # BLD: 0 /100 WBCS — SIGNIFICANT CHANGE UP (ref 0–0)
PLATELET # BLD AUTO: 212 K/UL — SIGNIFICANT CHANGE UP (ref 150–400)
RBC # BLD: 3.45 M/UL — LOW (ref 3.8–5.2)
RBC # FLD: 14.3 % — SIGNIFICANT CHANGE UP (ref 10.3–14.5)
RH IG SCN BLD-IMP: POSITIVE — SIGNIFICANT CHANGE UP
WBC # BLD: 6.62 K/UL — SIGNIFICANT CHANGE UP (ref 3.8–10.5)
WBC # FLD AUTO: 6.62 K/UL — SIGNIFICANT CHANGE UP (ref 3.8–10.5)

## 2023-05-03 RX ORDER — CITRIC ACID/SODIUM CITRATE 300-500 MG
15 SOLUTION, ORAL ORAL EVERY 6 HOURS
Refills: 0 | Status: DISCONTINUED | OUTPATIENT
Start: 2023-05-03 | End: 2023-05-04

## 2023-05-03 RX ORDER — OXYTOCIN 10 UNIT/ML
333.33 VIAL (ML) INJECTION
Qty: 20 | Refills: 0 | Status: DISCONTINUED | OUTPATIENT
Start: 2023-05-03 | End: 2023-05-04

## 2023-05-03 RX ORDER — AMPICILLIN TRIHYDRATE 250 MG
1 CAPSULE ORAL EVERY 4 HOURS
Refills: 0 | Status: DISCONTINUED | OUTPATIENT
Start: 2023-05-03 | End: 2023-05-04

## 2023-05-03 RX ORDER — AMPICILLIN TRIHYDRATE 250 MG
2 CAPSULE ORAL ONCE
Refills: 0 | Status: COMPLETED | OUTPATIENT
Start: 2023-05-03 | End: 2023-05-03

## 2023-05-03 RX ORDER — SODIUM CHLORIDE 9 MG/ML
1000 INJECTION, SOLUTION INTRAVENOUS
Refills: 0 | Status: DISCONTINUED | OUTPATIENT
Start: 2023-05-03 | End: 2023-05-04

## 2023-05-03 RX ORDER — CHLORHEXIDINE GLUCONATE 213 G/1000ML
1 SOLUTION TOPICAL ONCE
Refills: 0 | Status: COMPLETED | OUTPATIENT
Start: 2023-05-03 | End: 2023-05-03

## 2023-05-03 RX ADMIN — SODIUM CHLORIDE 125 MILLILITER(S): 9 INJECTION, SOLUTION INTRAVENOUS at 22:36

## 2023-05-03 RX ADMIN — Medication 200 GRAM(S): at 22:36

## 2023-05-03 RX ADMIN — CHLORHEXIDINE GLUCONATE 1 APPLICATION(S): 213 SOLUTION TOPICAL at 22:51

## 2023-05-03 NOTE — OB PROVIDER H&P - NSLOWPPHRISK_OBGYN_A_OB
No previous uterine incision/Becerril Pregnancy/Less than or equal to 4 previous vaginal births/No known bleeding disorder/No history of postpartum hemorrhage/No other PPH risks indicated

## 2023-05-03 NOTE — OB RN PATIENT PROFILE - ANESTHESIA, PREVIOUS REACTION, PROFILE
Encounter Date: 5/10/2018       History     Chief Complaint   Patient presents with    Abdominal Pain     Afebrile 51-year-old female with history of hypertension, abnormal Pap smear, DVT currently on Zarontin presents to the ED for evaluation of abdominal pain.  She does report that she has known gallstones and states that she is having pain secondary to these gallstones.  She states that today's abdominal pain has been intermittent for several months and is located to the epigastric and radiates to the bilateral upper quadrants.  She denies any change in the pain today however states that she is out of her pain medication.  She denies any exacerbating or mitigating factors.  She does not report eating any heavy or fatty foods for the past several days.  She does report some associated nausea, a few episodes of nonbloody emesis and increase in bowel movements however denies any diarrhea.  She states that she has had appointment with surgeon however due to her DVT was told to await completion of anticoagulant treatment.She denies any fever, chills, URI symptoms, palpitations, hematuria, dysuria or bloody stools.  I do know patient was in this ED 2 days ago with reported ear pain and treated for otitis externa however she does not report any symptoms related to this.      The history is provided by the patient.     Review of patient's allergies indicates:   Allergen Reactions    Corticosteroids (glucocorticoids) Swelling     Past Medical History:   Diagnosis Date    Abnormal Pap smear of cervix     5 years ago    Hypertension      Past Surgical History:   Procedure Laterality Date    HYSTERECTOMY      TONSILLECTOMY, ADENOIDECTOMY  06/2017    TUBAL LIGATION       History reviewed. No pertinent family history.  Social History   Substance Use Topics    Smoking status: Never Smoker    Smokeless tobacco: Never Used    Alcohol use No     Review of Systems   Constitutional: Negative for chills and fever.   HENT:  Negative for congestion and sore throat.    Respiratory: Negative for cough and shortness of breath.    Cardiovascular: Negative for chest pain.   Gastrointestinal: Positive for abdominal pain, nausea and vomiting. Negative for abdominal distention, constipation and diarrhea.   Genitourinary: Negative for dysuria and flank pain.   Musculoskeletal: Positive for arthralgias (left toe pain). Negative for back pain and myalgias.   Skin: Negative for rash.   Neurological: Negative for dizziness, weakness, light-headedness and headaches.   Hematological: Does not bruise/bleed easily.       Physical Exam     Initial Vitals [05/10/18 1334]   BP Pulse Resp Temp SpO2   (!) 174/90 72 16 98.3 °F (36.8 °C) 100 %      MAP       118         Physical Exam    Nursing note and vitals reviewed.  Constitutional: Vital signs are normal. She appears well-developed and well-nourished. She is cooperative.  Non-toxic appearance. She does not appear ill. No distress.   HENT:   Head: Normocephalic and atraumatic.   Mouth/Throat: Mucous membranes are dry. No posterior oropharyngeal edema or posterior oropharyngeal erythema.   Eyes: Conjunctivae and lids are normal.   Neck: Neck supple. No neck rigidity.   Cardiovascular: Normal rate and regular rhythm.   Pulmonary/Chest: Breath sounds normal. No respiratory distress. She has no wheezes. She has no rhonchi.   Abdominal: Soft. Normal appearance and bowel sounds are normal. There is generalized tenderness. There is no rigidity, no rebound, no guarding, no CVA tenderness and negative So's sign.   Musculoskeletal: Normal range of motion.   Neurological: She is alert and oriented to person, place, and time. GCS eye subscore is 4. GCS verbal subscore is 5. GCS motor subscore is 6.   Skin: Skin is warm, dry and intact. No rash noted.   Psychiatric: She has a normal mood and affect. Her speech is normal and behavior is normal. Thought content normal.         ED Course   Procedures  Labs Reviewed    COMPREHENSIVE METABOLIC PANEL - Abnormal; Notable for the following:        Result Value    CO2 34 (*)     Glucose 117 (*)     Albumin 3.1 (*)     Anion Gap 4 (*)     eGFR if non  59 (*)     All other components within normal limits   CBC W/ AUTO DIFFERENTIAL   LIPASE   URINALYSIS     Imaging Results          CT Abdomen Pelvis With Contrast (Final result)  Result time 05/10/18 16:31:20    Final result by Prosper Collado MD (05/10/18 16:31:20)                 Impression:      1. No acute process seen.  2. Cholelithiasis.  3. Bilateral medullary nephrocalcinosis.  4. Small hiatal hernia.  5. Few additional findings as above.      Electronically signed by: Prosper Collado MD  Date:    05/10/2018  Time:    16:31             Narrative:    EXAMINATION:  CT ABDOMEN PELVIS WITH CONTRAST    CLINICAL HISTORY:  abdominal pain;    TECHNIQUE:  Low dose axial images, sagittal and coronal reformations were obtained from the lung bases to the pubic symphysis following the IV administration of 100 mL of Omnipaque 350 contrast.  No oral contrast.  Delayed images were obtained.    COMPARISON:  Right upper quadrant ultrasound 12/05/2017 and CT thorax 08/19/2017    FINDINGS:  Included lung bases show minimal dependent atelectasis.  Base of the heart is within normal limits.    Cholelithiasis without CT evidence of acute cholecystitis.  The liver, pancreas, spleen, stomach, duodenum and bilateral adrenal glands are within normal limits.  Small accessory splenule.  Small hiatal hernia.    Kidneys are stable in size, shape and location concentrating and excreting contrast appropriately, noting bilateral medullary nephrocalcinosis and right extrarenal pelvis.  Simple cortical cyst at the right renal interpolar region.  A few scattered subcentimeter low-attenuation cortical foci at both kidneys which are too small to characterize.  There is cortical scarring at the left renal lower pole.  Bilateral ureters are within normal  limits.  Urinary bladder is within normal limits.  Uterus and bilateral adnexal regions are within normal limits.    Appendix and terminal ileum are within normal limits.  No evidence of bowel obstruction or inflammation.  Tiny fat containing umbilical hernia.  No pneumatosis or portal venous gas.    The aorta is within normal limits.  No ascites, free air or lymphadenopathy.    Included osseous structures show mild degenerative change without acute or destructive process seen.                                          Medical Decision Making:   Initial Assessment:   51-year-old female presents for evaluation of abdominal pain.  Sates pain is similar to previous episodes of biliary colic.  Exam reveals well-appearing female in no significant distress.  Mucous membranes mildly dryShe does have some abdominal tenderness on exam that appears generalized rebound, rigidity or guarding; no So's sign.  Bowel sounds within normal limits.  Differential Diagnosis:   Differential Diagnosis includes, but is not limited to:  AAA, aortic dissection, perforated viscous, SBO/volvulus, incarcerated/strangulated hernia, intussusception, ileus, appendicitis, cholecystitis, cholangitis, diverticulitis, esophagitis, hepatitis, nephrolithiasis, pancreatitis, gastroenteritis, colitis, IBD/IBS, biliary colic, GERD, PUD, constipation, UTI/pyelonephritis,  disorder.    Clinical Tests:   Lab Tests: Ordered and Reviewed  Radiological Study: Ordered and Reviewed  ED Management:  Lab work obtained to evaluate for this abdominal pain.  Labs significant for mildly elevated blood glucose with no other significant abnormalities.  She did have some generalized tenderness and no recent imaging to assess for this we will we did obtain a CT to better evaluate it.  CT with no significant abnormalities to explain etiology of patient's symptoms.  She did report some modest improvement with morphine.  Protonix and GI cocktail or ordered with some more  improvement in symptoms and successful PO challenge of water.  She and family member were requesting to go home as he did not want tenderness there bus transportation.  We were awaiting UA at this time however low suspicion of etiology of patient's symptoms and did inform her that should there be any abnormal findings we will call her with them.  She will be sent home with Carafate and Zofran with instructions to follow with her PCP or reference provided for further evaluation as there does not appear to be in acute emergent etiology of patient's symptoms today and as patient has Medicaid would likely need referral to GI specialist.  Review of the patient's past history, chart, medication records, and medical screening exam do not identify an emergent medical condition.  Based on the summation of all data available, I do not think it's in the patient's best interest for their safety to administer or prescribe any further controlled substances as she has had 2 recent narcotic prescriptions and is now out of this medication.  Further prescriptions need to come from either the PCP, a specialist treating a specific condition, or a pain management specialist. Strict instructions to follow up with primary care physician or reference provided for further assessment and evaluation. Given instructions to return for any acute symptoms and verbalized understanding of this medical plan.                  Attending Attestation:     Physician Attestation Statement for NP/PA:   I have conducted a face to face encounter with this patient in addition to the NP/PA, due to NP/PA Request    Other NP/PA Attestation Additions:      Medical Decision Making: Patient here with abdominal pain.  VSS, NAD, nontoxic appearing.  Obese abdomen.  Generalized TTP.  No rebound, no guarding. CT shows gallstones and hernia but no acute findings.  Patient is tolerating PO well.  She is stable for discharge.                      Clinical Impression:    The primary encounter diagnosis was Abdominal pain, unspecified abdominal location. A diagnosis of Calculus of gallbladder without cholecystitis without obstruction was also pertinent to this visit.    Disposition:   Disposition: Discharged  Condition: Stable                        DAMARIS Torres  05/10/18 6622       Christy Farr MD  05/10/18 8459     none

## 2023-05-03 NOTE — OB RN PATIENT PROFILE - FALL HARM RISK - UNIVERSAL INTERVENTIONS
Bed in lowest position, wheels locked, appropriate side rails in place/Call bell, personal items and telephone in reach/Instruct patient to call for assistance before getting out of bed or chair/Non-slip footwear when patient is out of bed/[place] to call system/Physically safe environment - no spills, clutter or unnecessary equipment/Purposeful Proactive Rounding/Room/bathroom lighting operational, light cord in reach

## 2023-05-03 NOTE — OB RN PATIENT PROFILE - PRESSURE ULCER(S)
Lisa Olivas LPN   51/8[birth year]74  9:70 AM EDT Back to Top      Tried to reach patient phone only rings, no voice mail to leave a message. no

## 2023-05-03 NOTE — OB PROVIDER H&P - ASSESSMENT
A/P: 41yo  @ 38w6d here for elective IOL  - Admit to L&D  - Routine labs   - EFM/TOCO: resuscitative measures prn  - PO cytotec as IOL agent  - Place cervical shaffer balloon at midnight  - Augment with Pitocin at 4a  - Anesthesia consult for epidural prn  - Expect     d/w Dr. Gregory Goel, PA-C

## 2023-05-03 NOTE — OB PROVIDER H&P - NSHPPHYSICALEXAM_GEN_ALL_CORE
PE:    T(C): 37.0 (05-03-23 @ 21:33), Max: 37 (05-03-23 @ 21:32)  HR: 96 (05-03-23 @ 22:31) (84 - 114)  BP: 104/75 (05-03-23 @ 21:32) (104/57 - 104/75)  RR: 18 (05-03-23 @ 21:32) (18 - 18)  SpO2: 95% (05-03-23 @ 22:31) (94% - 100%)    General: NAD, A&Ox3  CV: RRR  Lungs: Clear bilat   Abd: soft, nontender, gravid  VE: 1/50/-3  Bedside sono: vertex  EFM: 135/moderate variablity/+accels/-decels  TOCO: none

## 2023-05-03 NOTE — OB PROVIDER H&P - HISTORY OF PRESENT ILLNESS
Pt is a 41yo  @ 38w6d here for elective IOL. Pt denies LOF, VB, CTX. +FM. PNC uncomplicated GBS (+) EFW 3175g    OBHx: 2020: miscarriage x 1; 2018 FAVD, 7#3  GYNHx: +sm uterine fibroids (unsure exact size and location); denies ovarian cysts, hx of abnormal pap or STDs  PMHx: denies  PSurgHx: 2014: uterine polyp removal with D&C; : Benign breast bx; Lasix sx  Allergies: Tetracycline: hives  Meds: PNV  Social: denies smoking, alcohol, or illicit drug use  Psych: denies hx of anxiety or depression

## 2023-05-04 LAB
COVID-19 SPIKE DOMAIN AB INTERP: POSITIVE
COVID-19 SPIKE DOMAIN ANTIBODY RESULT: >250 U/ML — HIGH
SARS-COV-2 IGG+IGM SERPL QL IA: >250 U/ML — HIGH
SARS-COV-2 IGG+IGM SERPL QL IA: POSITIVE
T PALLIDUM AB TITR SER: NEGATIVE — SIGNIFICANT CHANGE UP

## 2023-05-04 RX ORDER — OXYTOCIN 10 UNIT/ML
41.67 VIAL (ML) INJECTION
Qty: 20 | Refills: 0 | Status: DISCONTINUED | OUTPATIENT
Start: 2023-05-04 | End: 2023-05-06

## 2023-05-04 RX ORDER — HYDROCORTISONE 1 %
1 OINTMENT (GRAM) TOPICAL EVERY 6 HOURS
Refills: 0 | Status: DISCONTINUED | OUTPATIENT
Start: 2023-05-04 | End: 2023-05-06

## 2023-05-04 RX ORDER — OXYCODONE HYDROCHLORIDE 5 MG/1
5 TABLET ORAL ONCE
Refills: 0 | Status: DISCONTINUED | OUTPATIENT
Start: 2023-05-04 | End: 2023-05-06

## 2023-05-04 RX ORDER — ONDANSETRON 8 MG/1
4 TABLET, FILM COATED ORAL ONCE
Refills: 0 | Status: COMPLETED | OUTPATIENT
Start: 2023-05-04 | End: 2023-05-04

## 2023-05-04 RX ORDER — BENZOCAINE 10 %
1 GEL (GRAM) MUCOUS MEMBRANE EVERY 6 HOURS
Refills: 0 | Status: DISCONTINUED | OUTPATIENT
Start: 2023-05-04 | End: 2023-05-06

## 2023-05-04 RX ORDER — SODIUM CHLORIDE 9 MG/ML
3 INJECTION INTRAMUSCULAR; INTRAVENOUS; SUBCUTANEOUS EVERY 8 HOURS
Refills: 0 | Status: DISCONTINUED | OUTPATIENT
Start: 2023-05-04 | End: 2023-05-06

## 2023-05-04 RX ORDER — LANOLIN
1 OINTMENT (GRAM) TOPICAL EVERY 6 HOURS
Refills: 0 | Status: DISCONTINUED | OUTPATIENT
Start: 2023-05-04 | End: 2023-05-06

## 2023-05-04 RX ORDER — MAGNESIUM HYDROXIDE 400 MG/1
30 TABLET, CHEWABLE ORAL
Refills: 0 | Status: DISCONTINUED | OUTPATIENT
Start: 2023-05-04 | End: 2023-05-06

## 2023-05-04 RX ORDER — DIPHENHYDRAMINE HCL 50 MG
25 CAPSULE ORAL EVERY 6 HOURS
Refills: 0 | Status: DISCONTINUED | OUTPATIENT
Start: 2023-05-04 | End: 2023-05-06

## 2023-05-04 RX ORDER — PRAMOXINE HYDROCHLORIDE 150 MG/15G
1 AEROSOL, FOAM RECTAL EVERY 4 HOURS
Refills: 0 | Status: DISCONTINUED | OUTPATIENT
Start: 2023-05-04 | End: 2023-05-06

## 2023-05-04 RX ORDER — IBUPROFEN 200 MG
600 TABLET ORAL EVERY 6 HOURS
Refills: 0 | Status: DISCONTINUED | OUTPATIENT
Start: 2023-05-04 | End: 2023-05-06

## 2023-05-04 RX ORDER — OXYTOCIN 10 UNIT/ML
4 VIAL (ML) INJECTION
Qty: 30 | Refills: 0 | Status: DISCONTINUED | OUTPATIENT
Start: 2023-05-04 | End: 2023-05-04

## 2023-05-04 RX ORDER — AER TRAVELER 0.5 G/1
1 SOLUTION RECTAL; TOPICAL EVERY 4 HOURS
Refills: 0 | Status: DISCONTINUED | OUTPATIENT
Start: 2023-05-04 | End: 2023-05-06

## 2023-05-04 RX ORDER — TETANUS TOXOID, REDUCED DIPHTHERIA TOXOID AND ACELLULAR PERTUSSIS VACCINE, ADSORBED 5; 2.5; 8; 8; 2.5 [IU]/.5ML; [IU]/.5ML; UG/.5ML; UG/.5ML; UG/.5ML
0.5 SUSPENSION INTRAMUSCULAR ONCE
Refills: 0 | Status: DISCONTINUED | OUTPATIENT
Start: 2023-05-04 | End: 2023-05-06

## 2023-05-04 RX ORDER — IBUPROFEN 200 MG
600 TABLET ORAL EVERY 6 HOURS
Refills: 0 | Status: COMPLETED | OUTPATIENT
Start: 2023-05-04 | End: 2024-04-01

## 2023-05-04 RX ORDER — ACETAMINOPHEN 500 MG
975 TABLET ORAL
Refills: 0 | Status: DISCONTINUED | OUTPATIENT
Start: 2023-05-04 | End: 2023-05-06

## 2023-05-04 RX ORDER — DIBUCAINE 1 %
1 OINTMENT (GRAM) RECTAL EVERY 6 HOURS
Refills: 0 | Status: DISCONTINUED | OUTPATIENT
Start: 2023-05-04 | End: 2023-05-06

## 2023-05-04 RX ORDER — OXYCODONE HYDROCHLORIDE 5 MG/1
5 TABLET ORAL
Refills: 0 | Status: DISCONTINUED | OUTPATIENT
Start: 2023-05-04 | End: 2023-05-06

## 2023-05-04 RX ORDER — SIMETHICONE 80 MG/1
80 TABLET, CHEWABLE ORAL EVERY 4 HOURS
Refills: 0 | Status: DISCONTINUED | OUTPATIENT
Start: 2023-05-04 | End: 2023-05-06

## 2023-05-04 RX ORDER — KETOROLAC TROMETHAMINE 30 MG/ML
30 SYRINGE (ML) INJECTION ONCE
Refills: 0 | Status: DISCONTINUED | OUTPATIENT
Start: 2023-05-04 | End: 2023-05-04

## 2023-05-04 RX ADMIN — Medication 975 MILLIGRAM(S): at 22:40

## 2023-05-04 RX ADMIN — Medication 108 GRAM(S): at 14:40

## 2023-05-04 RX ADMIN — Medication 108 GRAM(S): at 02:45

## 2023-05-04 RX ADMIN — Medication 4 MILLIUNIT(S)/MIN: at 04:49

## 2023-05-04 RX ADMIN — Medication 15 MILLILITER(S): at 07:35

## 2023-05-04 RX ADMIN — Medication 108 GRAM(S): at 06:33

## 2023-05-04 RX ADMIN — ONDANSETRON 4 MILLIGRAM(S): 8 TABLET, FILM COATED ORAL at 13:15

## 2023-05-04 RX ADMIN — Medication 975 MILLIGRAM(S): at 21:45

## 2023-05-04 RX ADMIN — Medication 30 MILLIGRAM(S): at 18:03

## 2023-05-04 RX ADMIN — Medication 108 GRAM(S): at 10:35

## 2023-05-04 NOTE — OB PROVIDER DELIVERY SUMMARY - NSPROVIDERDELIVERYNOTE_OBGYN_ALL_OB_FT
Head delivered in OA. No nuchal. Anterior shoulder was delivered followed by the posterior shoulder and remainder of the body.     Infant passed to the mother. Cord clamping was delayed for 1 minute. Cord clamped and cut. Cord gasses obtained via a segment of cord.     Oxytocin given. Placenta was delivered spontaneously intact. Uterine massage was performed. Fundus firm. Intermittent uterine atony ameliorated w/ bimanual massage     Second degree laceration was repaired w/ 2-0 Vicryl rapide in a running fashion. Skin reapproximated w/ 3-0 Vicryl rapide in an interrupted fashion     Adequate hemostasis. EBL 350cc  Count correct x2    Baby Boy Karel Baby boy Karel delivered in OA position. No nuchal. Anterior shoulder was delivered followed by the posterior shoulder and remainder of the body.     Infant passed to the mother. Cord clamping was delayed for 1 minute. Cord clamped and cut. Cord gasses obtained via a segment of cord.     Oxytocin given. Placenta was delivered spontaneously intact. Uterine massage was performed. Fundus firm. Intermittent uterine atony ameliorated w/ bimanual massage     Second degree laceration was repaired w/ 2-0 Vicryl rapide in a running fashion. Skin reapproximated w/ 3-0 Vicryl rapide in an interrupted fashion     Adequate hemostasis. EBL 350cc  Count correct x2

## 2023-05-04 NOTE — OB RN DELIVERY SUMMARY - NS_NUCHALCORD_OBGYN_ALL_OB
Detail Level: Detailed Render Number Of Lesions Treated: no Prep Text (Optional): Prior to removal the treatment areas were prepped in the usual fashion. Post-Care Instructions: I reviewed with the patient in detail post-care instructions. Patient is to keep the treatment areas dry overnight, and then apply bacitracin twice daily until healed. Patient may apply hydrogen peroxide soaks to remove any crusting. Acne Type: Comedonal Lesions Extraction Method: 18 gauge needle and comedo extractor Consent was obtained and risks were reviewed including but not limited to scarring, infection, bleeding, scabbing, incomplete removal, and allergy to anesthesia. None

## 2023-05-04 NOTE — OB PROVIDER DELIVERY SUMMARY - NSSELHIDDEN_OBGYN_ALL_OB_FT
[NS_DeliveryAttending1_OBGYN_ALL_OB_FT:[id number]=],[NS_DeliveryRN_OBGYN_ALL_OB_FT:[id number]=],[NS_DeliveryAssist1_OBGYN_ALL_OB_FT:[id number]=]

## 2023-05-04 NOTE — OB PROVIDER LABOR PROGRESS NOTE - ASSESSMENT
A/P:  -EFM: resuscitative measures prn  -augment with pitocin  -pain control: epidural in place  -anticipate     
A/P:  -continue current management
AROM clear  peanut ball  continue pitocin  expect     gccar md
continue pitocin  peanut ball    ellen guajardo

## 2023-05-04 NOTE — OB RN DELIVERY SUMMARY - NS_SEPSISRSKCALC_OBGYN_ALL_OB_FT
EOS calculated successfully. EOS Risk Factor: 0.5/1000 live births ([facility] national incidence); GA=39w;Temp=98.6; ROM=8.55; GBS='Positive'; Antibiotics='Broad spectrum antibiotics 2-3.9 hrs prior to birth'

## 2023-05-04 NOTE — OB RN DELIVERY SUMMARY - NSSELHIDDEN_OBGYN_ALL_OB_FT
[NS_DeliveryAttending1_OBGYN_ALL_OB_FT:[id number]],[NS_DeliveryRN_OBGYN_ALL_OB_FT:[id number]] [NS_DeliveryAttending1_OBGYN_ALL_OB_FT:[id number]=],[NS_DeliveryRN_OBGYN_ALL_OB_FT:[id number]=],[NS_DeliveryAssist1_OBGYN_ALL_OB_FT:[id number]=]

## 2023-05-04 NOTE — OB PROVIDER LABOR PROGRESS NOTE - NS_SUBJECTIVE/OBJECTIVE_OBGYN_ALL_OB_FT
Pt seen for cervical exam s/p removal of cervical shaffer balloon
Pt examined for progress
Pt seen for placement of cervical shaffer balloon with sterile techniques; 60cc NS instilled into uterine/vaginal balloons; placed w/o incidence; pt tolerated procedure well
Pt examined for progress

## 2023-05-05 RX ADMIN — Medication 975 MILLIGRAM(S): at 20:43

## 2023-05-05 RX ADMIN — Medication 975 MILLIGRAM(S): at 03:41

## 2023-05-05 RX ADMIN — Medication 975 MILLIGRAM(S): at 09:40

## 2023-05-05 RX ADMIN — Medication 600 MILLIGRAM(S): at 23:31

## 2023-05-05 RX ADMIN — Medication 600 MILLIGRAM(S): at 18:40

## 2023-05-05 RX ADMIN — Medication 600 MILLIGRAM(S): at 06:03

## 2023-05-05 RX ADMIN — Medication 600 MILLIGRAM(S): at 18:12

## 2023-05-05 RX ADMIN — Medication 600 MILLIGRAM(S): at 12:30

## 2023-05-05 RX ADMIN — Medication 600 MILLIGRAM(S): at 12:00

## 2023-05-05 RX ADMIN — Medication 975 MILLIGRAM(S): at 21:13

## 2023-05-05 RX ADMIN — Medication 600 MILLIGRAM(S): at 01:20

## 2023-05-05 RX ADMIN — Medication 975 MILLIGRAM(S): at 04:21

## 2023-05-05 RX ADMIN — Medication 975 MILLIGRAM(S): at 15:15

## 2023-05-05 RX ADMIN — Medication 600 MILLIGRAM(S): at 06:55

## 2023-05-05 RX ADMIN — Medication 1 TABLET(S): at 12:00

## 2023-05-05 RX ADMIN — Medication 600 MILLIGRAM(S): at 00:23

## 2023-05-05 RX ADMIN — Medication 975 MILLIGRAM(S): at 09:09

## 2023-05-05 RX ADMIN — Medication 975 MILLIGRAM(S): at 15:45

## 2023-05-05 NOTE — PROGRESS NOTE ADULT - ATTENDING COMMENTS
Patient seen and examined.  Agree with above.    Regular diet  ambulate  po pain meds  desires d/c tomorrow    Marisol Arreguin MD  OB attg

## 2023-05-06 ENCOUNTER — TRANSCRIPTION ENCOUNTER (OUTPATIENT)
Age: 43
End: 2023-05-06

## 2023-05-06 VITALS
TEMPERATURE: 98 F | HEART RATE: 88 BPM | SYSTOLIC BLOOD PRESSURE: 118 MMHG | OXYGEN SATURATION: 97 % | DIASTOLIC BLOOD PRESSURE: 71 MMHG | RESPIRATION RATE: 18 BRPM

## 2023-05-06 PROCEDURE — 85025 COMPLETE CBC W/AUTO DIFF WBC: CPT

## 2023-05-06 PROCEDURE — 86780 TREPONEMA PALLIDUM: CPT

## 2023-05-06 PROCEDURE — 86769 SARS-COV-2 COVID-19 ANTIBODY: CPT

## 2023-05-06 PROCEDURE — 86850 RBC ANTIBODY SCREEN: CPT

## 2023-05-06 PROCEDURE — 59050 FETAL MONITOR W/REPORT: CPT

## 2023-05-06 PROCEDURE — 86900 BLOOD TYPING SEROLOGIC ABO: CPT

## 2023-05-06 PROCEDURE — 86901 BLOOD TYPING SEROLOGIC RH(D): CPT

## 2023-05-06 RX ORDER — ACETAMINOPHEN 500 MG
3 TABLET ORAL
Qty: 0 | Refills: 0 | DISCHARGE
Start: 2023-05-06

## 2023-05-06 RX ORDER — IBUPROFEN 200 MG
1 TABLET ORAL
Qty: 0 | Refills: 0 | DISCHARGE
Start: 2023-05-06

## 2023-05-06 RX ORDER — ACETAMINOPHEN 500 MG
2 TABLET ORAL
Qty: 0 | Refills: 0 | DISCHARGE

## 2023-05-06 RX ORDER — IBUPROFEN 200 MG
1 TABLET ORAL
Qty: 0 | Refills: 0 | DISCHARGE

## 2023-05-06 RX ADMIN — Medication 600 MILLIGRAM(S): at 17:07

## 2023-05-06 RX ADMIN — Medication 600 MILLIGRAM(S): at 12:08

## 2023-05-06 RX ADMIN — Medication 975 MILLIGRAM(S): at 15:35

## 2023-05-06 RX ADMIN — Medication 600 MILLIGRAM(S): at 12:40

## 2023-05-06 RX ADMIN — Medication 975 MILLIGRAM(S): at 03:35

## 2023-05-06 RX ADMIN — Medication 975 MILLIGRAM(S): at 10:00

## 2023-05-06 RX ADMIN — Medication 600 MILLIGRAM(S): at 00:01

## 2023-05-06 RX ADMIN — Medication 600 MILLIGRAM(S): at 05:53

## 2023-05-06 RX ADMIN — Medication 975 MILLIGRAM(S): at 04:05

## 2023-05-06 RX ADMIN — Medication 975 MILLIGRAM(S): at 15:02

## 2023-05-06 RX ADMIN — Medication 1 TABLET(S): at 12:08

## 2023-05-06 RX ADMIN — Medication 975 MILLIGRAM(S): at 09:23

## 2023-05-06 RX ADMIN — Medication 600 MILLIGRAM(S): at 05:23

## 2023-05-06 RX ADMIN — Medication 600 MILLIGRAM(S): at 17:43

## 2023-05-06 NOTE — DISCHARGE NOTE OB - CARE PROVIDER_API CALL
Fiorella Orona)  [facility] Ob[facility]etric[facility]ne[facility]  [address] [place] [address], [address]  [place] [address]  Phone: [phone]  Fax: [phone]  Follow Up Time: Routine

## 2023-05-06 NOTE — DISCHARGE NOTE OB - CARE PLAN
1 Principal Discharge DX:	 (normal spontaneous vaginal delivery)  Assessment and plan of treatment:	po pain meds  ambulate  reg diet

## 2023-05-06 NOTE — DISCHARGE NOTE OB - MEDICATION SUMMARY - MEDICATIONS TO TAKE
I will START or STAY ON the medications listed below when I get home from the hospital:    ibuprofen 600 mg oral tablet  -- 1 tab(s) by mouth every 6 hours  -- Indication: For Pain    acetaminophen 325 mg oral tablet  -- 3 tab(s) by mouth every 8 hours  -- Indication: For Pain

## 2023-05-06 NOTE — PROGRESS NOTE ADULT - SUBJECTIVE AND OBJECTIVE BOX
OB Progress Note:  PPD#1    S: 41yo PPD#1 s/p . Pain controlled. Patient tolerating regular diet, voiding spontaneously, and ambulating. Denies significant VB, chest pain, shortness of breath, n/v, light-headedness/dizziness. Just notes some soreness at epidural insertion site       O:  Vitals:  Vital Signs Last 24 Hrs  T(C): 36.4 (05 May 2023 06:09), Max: 37.0 (04 May 2023 13:12)  T(F): 97.5 (05 May 2023 06:09), Max: 98.6 (04 May 2023 13:12)  HR: 77 (05 May 2023 06:09) (77 - 118)  BP: 96/61 (05 May 2023 06:09) (83/53 - 113/73)  BP(mean): --  RR: 18 (05 May 2023 06:09) (18 - 18)  SpO2: 97% (05 May 2023 06:09) (93% - 100%)    Parameters below as of 05 May 2023 06:09  Patient On (Oxygen Delivery Method): room air        MEDICATIONS  (STANDING):  acetaminophen     Tablet .. 975 milliGRAM(s) Oral <User Schedule>  diphtheria/tetanus/pertussis (acellular) Vaccine (Adacel) 0.5 milliLiter(s) IntraMuscular once  ibuprofen  Tablet. 600 milliGRAM(s) Oral every 6 hours  oxytocin Infusion 41.667 milliUNIT(s)/Min (125 mL/Hr) IV Continuous <Continuous>  prenatal multivitamin 1 Tablet(s) Oral daily  sodium chloride 0.9% lock flush 3 milliLiter(s) IV Push every 8 hours      Labs:  Blood type: O Positive  Rubella IgG: RPR: Negative                          10.6<L>   6.62 >-----------< 212    (  @ 22:31 )             32.3<L>                  Physical Exam:  General: No acute distress. Resting comfortably prior to evaluation  Respiratory: No respiratory distress. Unlabored breathing   Abdomen: Soft. Non-tender. Non-distended. Fundus is firm  Extremities: No calf tenderness bilaterally    
S: Patient is doing well without complaints. Tolerates regular diet. She states lochia is in WNL. Ambulating without difficulty. Denies N/V. Voiding freely. Passing flatus. Pain well controlled with oral pain medications. Denies any HA/vision changes, CP/SOB, F/C/S.    O: Vital Signs Last 24 Hrs  T(C): 36.6 (06 May 2023 06:00), Max: 36.7 (05 May 2023 13:30)  T(F): 97.8 (06 May 2023 06:00), Max: 98.1 (05 May 2023 16:49)  HR: 74 (06 May 2023 06:00) (74 - 99)  BP: 106/70 (06 May 2023 06:00) (106/70 - 116/75)  BP(mean): --  RR: 18 (06 May 2023 06:00) (18 - 18)  SpO2: 97% (06 May 2023 06:00) (96% - 98%)    Parameters below as of 06 May 2023 06:00  Patient On (Oxygen Delivery Method): room air        Physical Exam:  General: NAD  Abdomen: soft, non-tender, non-distended, fundus firm  Vaginal: deferred  Ext: NTBL    Labs:                MEDICATIONS  (STANDING):  acetaminophen     Tablet .. 975 milliGRAM(s) Oral <User Schedule>  diphtheria/tetanus/pertussis (acellular) Vaccine (Adacel) 0.5 milliLiter(s) IntraMuscular once  ibuprofen  Tablet. 600 milliGRAM(s) Oral every 6 hours  oxytocin Infusion 41.667 milliUNIT(s)/Min (125 mL/Hr) IV Continuous <Continuous>  prenatal multivitamin 1 Tablet(s) Oral daily  sodium chloride 0.9% lock flush 3 milliLiter(s) IV Push every 8 hours

## 2023-05-06 NOTE — DISCHARGE NOTE OB - PATIENT PORTAL LINK FT
You can access the FollowMyHealth Patient Portal offered by [facility] by registering at the following website: [email or web address]. By joining [facility]’s FollowMyHealth portal, you will also be able to view your health information using other applications (apps) compatible with our system.

## 2023-05-06 NOTE — DISCHARGE NOTE OB - NS MD DC FALL RISK RISK
For information on Fall & Injury Prevention, visit: [email or web address] OR  [email or web address] OR  https://www.cdc.gov/steadi/patient.html

## 2023-05-06 NOTE — PROGRESS NOTE ADULT - ASSESSMENT
A/P:  Doing well on ppd2    stable for d/c  po pain meds  ambulate  reg diet      Marisol Arreguin  OB attg
A/P: 43yo PPD#1 s/p .  Patient is stable and progressing appropriately    #Post-partum  - Pain well controlled, continue current pain regimen  - Continue regular diet  - Reviewed use of stool softeners if desired     Chucho Cobos, PGY-1  Ob/Gyn
minimum assist (75% patients effort)

## 2023-06-12 NOTE — H&P PST ADULT - BACK
----- Message from Jaskaran Reese MD sent at 6/11/2023 12:26 PM CDT -----  Urine culture is positive. Take cephalexin 250 mg tid x 1 week total.   No deformity or limitation of movement

## 2023-10-09 ENCOUNTER — RESULT REVIEW (OUTPATIENT)
Age: 43
End: 2023-10-09

## 2023-10-09 ENCOUNTER — APPOINTMENT (OUTPATIENT)
Dept: OBGYN | Facility: CLINIC | Age: 43
End: 2023-10-09
Payer: COMMERCIAL

## 2023-10-09 PROCEDURE — 99396 PREV VISIT EST AGE 40-64: CPT

## 2024-01-01 NOTE — PATIENT PROFILE OB - HARM RISK FACTORS
Pt had axillary temperatures over 100 degrees. Rectal temp was taken, Pt rectal temp was 100 degrees. [facility] was called. This RN spoke with Dr. Anderson.  instructed to watch pt and if temperature reached 100.4 degrees or above to call back.     This RN will recheck pts temp in 1 hour, if temp remains under 100 then will recheck temp every 2 hours. Pt room has been cooled and MOB has been instructed to not cover baby with multiple blankets.   no

## 2024-12-27 ENCOUNTER — APPOINTMENT (OUTPATIENT)
Dept: OBGYN | Facility: CLINIC | Age: 44
End: 2024-12-27
Payer: COMMERCIAL

## 2024-12-27 PROCEDURE — 99459 PELVIC EXAMINATION: CPT

## 2024-12-27 PROCEDURE — 99386 PREV VISIT NEW AGE 40-64: CPT

## 2025-02-20 ENCOUNTER — NON-APPOINTMENT (OUTPATIENT)
Age: 45
End: 2025-02-20

## 2025-02-25 ENCOUNTER — APPOINTMENT (OUTPATIENT)
Dept: GASTROENTEROLOGY | Facility: CLINIC | Age: 45
End: 2025-02-25
Payer: COMMERCIAL

## 2025-02-25 VITALS
BODY MASS INDEX: 23.81 KG/M2 | WEIGHT: 126.13 LBS | DIASTOLIC BLOOD PRESSURE: 70 MMHG | RESPIRATION RATE: 14 BRPM | HEIGHT: 61 IN | HEART RATE: 68 BPM | TEMPERATURE: 98 F | OXYGEN SATURATION: 99 % | SYSTOLIC BLOOD PRESSURE: 120 MMHG

## 2025-02-25 DIAGNOSIS — D18.03 HEMANGIOMA OF INTRA-ABDOMINAL STRUCTURES: ICD-10-CM

## 2025-02-25 PROCEDURE — 99204 OFFICE O/P NEW MOD 45 MIN: CPT

## 2025-02-25 RX ORDER — LORATADINE 10 MG/1
10 CAPSULE, LIQUID FILLED ORAL
Refills: 0 | Status: ACTIVE | COMMUNITY

## 2025-03-07 NOTE — PATIENT PROFILE OB - NS PRO TALK SOMEONE YN
Problem: PAIN - ADULT  Goal: Verbalizes/displays adequate comfort level or baseline comfort level  Description: Interventions:  - Encourage patient to monitor pain and request assistance  - Assess pain using appropriate pain scale  - Administer analgesics based on type and severity of pain and evaluate response  - Implement non-pharmacological measures as appropriate and evaluate response  - Consider cultural and social influences on pain and pain management  - Notify physician/advanced practitioner if interventions unsuccessful or patient reports new pain  Outcome: Progressing     Problem: INFECTION - ADULT  Goal: Absence or prevention of progression during hospitalization  Description: INTERVENTIONS:  - Assess and monitor for signs and symptoms of infection  - Monitor lab/diagnostic results  - Monitor all insertion sites, i.e. indwelling lines, tubes, and drains  - Monitor endotracheal if appropriate and nasal secretions for changes in amount and color  - [place] appropriate cooling/warming therapies per order  - Administer medications as ordered  - Instruct and encourage patient and family to use good hand hygiene technique  - Identify and instruct in appropriate isolation precautions for identified infection/condition  Outcome: Progressing  Goal: Absence of fever/infection during neutropenic period  Description: INTERVENTIONS:  - Monitor WBC    Outcome: Progressing     Problem: DISCHARGE PLANNING  Goal: Discharge to home or other facility with appropriate resources  Description: INTERVENTIONS:  - Identify barriers to discharge w/patient and caregiver  - Arrange for needed discharge resources and transportation as appropriate  - Identify discharge learning needs (meds, wound care, etc.)  - Arrange for interpretive services to assist at discharge as needed  - Refer to Case Management Department for coordinating discharge planning if the patient needs post-hospital services based on physician/advanced  practitioner order or complex needs related to functional status, cognitive ability, or social support system  Outcome: Progressing     Problem: Knowledge Deficit  Goal: Patient/family/caregiver demonstrates understanding of disease process, treatment plan, medications, and discharge instructions  Description: Complete learning assessment and assess knowledge base.  Interventions:  - Provide teaching at level of understanding  - Provide teaching via preferred learning methods  Outcome: Progressing      unable to assess
